# Patient Record
Sex: FEMALE | Race: WHITE | Employment: OTHER | URBAN - METROPOLITAN AREA
[De-identification: names, ages, dates, MRNs, and addresses within clinical notes are randomized per-mention and may not be internally consistent; named-entity substitution may affect disease eponyms.]

---

## 2017-03-03 ENCOUNTER — HOSPITAL ENCOUNTER (OUTPATIENT)
Dept: NUCLEAR MEDICINE | Facility: CLINIC | Age: 67
Setting detail: NUCLEAR MEDICINE
Discharge: HOME | End: 2017-03-03
Attending: INTERNAL MEDICINE | Admitting: INTERNAL MEDICINE
Payer: COMMERCIAL

## 2017-03-03 DIAGNOSIS — I10 BENIGN ESSENTIAL HYPERTENSION: ICD-10-CM

## 2017-03-03 DIAGNOSIS — I25.10 CORONARY ARTERY DISEASE INVOLVING NATIVE CORONARY ARTERY, ANGINA PRESENCE UNSPECIFIED, UNSPECIFIED WHETHER NATIVE OR TRANSPLANTED HEART: ICD-10-CM

## 2017-03-03 DIAGNOSIS — E78.5 HYPERLIPIDEMIA LDL GOAL <70: ICD-10-CM

## 2017-03-03 LAB
ALT SERPL W P-5'-P-CCNC: 30 U/L (ref 0–50)
ANION GAP SERPL CALCULATED.3IONS-SCNC: 6 MMOL/L (ref 3–14)
BUN SERPL-MCNC: 15 MG/DL (ref 7–30)
CALCIUM SERPL-MCNC: 9.2 MG/DL (ref 8.5–10.1)
CHLORIDE SERPL-SCNC: 104 MMOL/L (ref 94–109)
CHOLEST SERPL-MCNC: 142 MG/DL
CO2 SERPL-SCNC: 30 MMOL/L (ref 20–32)
CREAT SERPL-MCNC: 0.65 MG/DL (ref 0.52–1.04)
GFR SERPL CREATININE-BSD FRML MDRD: NORMAL ML/MIN/1.7M2
GLUCOSE SERPL-MCNC: 80 MG/DL (ref 70–99)
HDLC SERPL-MCNC: 62 MG/DL
LDLC SERPL CALC-MCNC: 64 MG/DL
NONHDLC SERPL-MCNC: 80 MG/DL
POTASSIUM SERPL-SCNC: 4.2 MMOL/L (ref 3.4–5.3)
SODIUM SERPL-SCNC: 140 MMOL/L (ref 133–144)
TRIGL SERPL-MCNC: 78 MG/DL

## 2017-03-03 PROCEDURE — 93017 CV STRESS TEST TRACING ONLY: CPT

## 2017-03-03 PROCEDURE — A9502 TC99M TETROFOSMIN: HCPCS | Performed by: INTERNAL MEDICINE

## 2017-03-03 PROCEDURE — 84460 ALANINE AMINO (ALT) (SGPT): CPT | Performed by: INTERNAL MEDICINE

## 2017-03-03 PROCEDURE — 34300033 ZZH RX 343: Performed by: INTERNAL MEDICINE

## 2017-03-03 PROCEDURE — 93016 CV STRESS TEST SUPVJ ONLY: CPT | Performed by: INTERNAL MEDICINE

## 2017-03-03 PROCEDURE — 78452 HT MUSCLE IMAGE SPECT MULT: CPT

## 2017-03-03 PROCEDURE — 80061 LIPID PANEL: CPT | Performed by: INTERNAL MEDICINE

## 2017-03-03 PROCEDURE — 93018 CV STRESS TEST I&R ONLY: CPT | Performed by: INTERNAL MEDICINE

## 2017-03-03 PROCEDURE — 80048 BASIC METABOLIC PNL TOTAL CA: CPT | Performed by: INTERNAL MEDICINE

## 2017-03-03 PROCEDURE — 36415 COLL VENOUS BLD VENIPUNCTURE: CPT | Performed by: INTERNAL MEDICINE

## 2017-03-03 PROCEDURE — 78452 HT MUSCLE IMAGE SPECT MULT: CPT | Mod: 26 | Performed by: INTERNAL MEDICINE

## 2017-03-03 PROCEDURE — 25000128 H RX IP 250 OP 636: Performed by: INTERNAL MEDICINE

## 2017-03-03 RX ORDER — REGADENOSON 0.08 MG/ML
0.4 INJECTION, SOLUTION INTRAVENOUS ONCE
Status: COMPLETED | OUTPATIENT
Start: 2017-03-03 | End: 2017-03-03

## 2017-03-03 RX ADMIN — TETROFOSMIN 9.9 MCI.: 0.23 INJECTION, POWDER, LYOPHILIZED, FOR SOLUTION INTRAVENOUS at 12:15

## 2017-03-03 RX ADMIN — TETROFOSMIN 32.6 MCI.: 0.23 INJECTION, POWDER, LYOPHILIZED, FOR SOLUTION INTRAVENOUS at 13:26

## 2017-03-03 RX ADMIN — REGADENOSON 0.4 MG: 0.08 INJECTION, SOLUTION INTRAVENOUS at 13:26

## 2017-03-06 ENCOUNTER — OFFICE VISIT (OUTPATIENT)
Dept: CARDIOLOGY | Facility: CLINIC | Age: 67
End: 2017-03-06
Attending: INTERNAL MEDICINE
Payer: COMMERCIAL

## 2017-03-06 VITALS
BODY MASS INDEX: 38.78 KG/M2 | DIASTOLIC BLOOD PRESSURE: 75 MMHG | SYSTOLIC BLOOD PRESSURE: 116 MMHG | WEIGHT: 192 LBS | OXYGEN SATURATION: 94 % | HEART RATE: 61 BPM

## 2017-03-06 DIAGNOSIS — I10 BENIGN ESSENTIAL HYPERTENSION: ICD-10-CM

## 2017-03-06 DIAGNOSIS — Z98.61 POSTSURGICAL PERCUTANEOUS TRANSLUMINAL CORONARY ANGIOPLASTY STATUS: Primary | ICD-10-CM

## 2017-03-06 DIAGNOSIS — I25.10 CORONARY ARTERY DISEASE INVOLVING NATIVE CORONARY ARTERY, ANGINA PRESENCE UNSPECIFIED, UNSPECIFIED WHETHER NATIVE OR TRANSPLANTED HEART: ICD-10-CM

## 2017-03-06 DIAGNOSIS — R94.39 ABNORMAL CARDIOVASCULAR STRESS TEST: ICD-10-CM

## 2017-03-06 DIAGNOSIS — E78.5 HYPERLIPIDEMIA LDL GOAL <70: ICD-10-CM

## 2017-03-06 PROCEDURE — 99214 OFFICE O/P EST MOD 30 MIN: CPT | Performed by: INTERNAL MEDICINE

## 2017-03-06 NOTE — PATIENT INSTRUCTIONS
Thank you for your [facility] visit today. Your provider has recommended the following:  Medication Changes:  1. STOP brilinta at the end of your prescription (June 17)  Recommendations:  1. Fasting blood work to be done in 4 months just prior to your follow up with Dr Tovar  Follow-up:  See Dr Tovar for cardiology follow up in 4 months.  We kindly ask that you call cardiology scheduling at [phone] three months prior to requested revisit date to schedule future cardiology appointments.  Reminder:  1. Please bring in your current medication list or your medication, over the counter supplements and vitamin bottles as we will review these at each office visit.               [facility] HEART CARE  [facility]~[address]~[place], [place]~[address]  Questions about your visit today?  Call your Cardiology Clinic RN's-Akilah Gabriel and/or Rocio Mckenzie at [phone].

## 2017-03-06 NOTE — MR AVS SNAPSHOT
After Visit Summary   3/6/2017    Sandra K Severson    MRN: [id number]           Patient Information     Date Of Birth          1950        Visit Information        Provider Department      3/6/2017 3:00 PM Randy Tovar MD [facility] PHYSICIAN HEART AT [facility]        Today's Diagnoses     Coronary artery disease involving native coronary artery, angina presence unspecified, unspecified whether native or transplanted heart        Benign essential hypertension        Hyperlipidemia LDL goal <70          Care Instructions    Thank you for your  Heart Care visit today. Your provider has recommended the following:  Medication Changes:  1. STOP brilinta at the end of your prescription (June 17)  Recommendations:  1. Fasting blood work to be done in 4 months just prior to your follow up with Dr Tovar  Follow-up:  See Dr Tovar for cardiology follow up in 4 months.  We kindly ask that you call cardiology scheduling at [phone] three months prior to requested revisit date to schedule future cardiology appointments.  Reminder:  1. Please bring in your current medication list or your medication, over the counter supplements and vitamin bottles as we will review these at each office visit.               [facility]~[address]~[place], [place]~[address]  Questions about your visit today?  Call your Cardiology Clinic RN's-Akilah Gabriel and/or Rocio Mckenzie at [phone].              Follow-ups after your visit        Additional Services     Follow-Up with Cardiologist                 Future tests that were ordered for you today     Open Future Orders        Priority Expected Expires Ordered    Lipid panel reflex to direct LDL Routine 7/6/2017 3/6/2019 3/6/2017    ALT Routine 7/6/2017 3/6/2019 3/6/2017    Basic metabolic panel Routine 7/6/2017 3/6/2019 3/6/2017    Follow-Up with Cardiologist Routine 7/6/2017  "3/6/2019 3/6/2017            Who to contact     If you have questions or need follow up information about today's clinic visit or your schedule please contact [facility] PHYSICIAN HEART AT [facility] directly at [phone].  Normal or non-critical lab and imaging results will be communicated to you by MyChart, letter or phone within 4 business days after the clinic has received the results. If you do not hear from us within 7 days, please contact the clinic through INRIXhart or phone. If you have a critical or abnormal lab result, we will notify you by phone as soon as possible.  Submit refill requests through MyCadbox or call your pharmacy and they will forward the refill request to us. Please allow 3 business days for your refill to be completed.          Additional Information About Your Visit        INRIXharSportID Information     MyCadbox lets you send messages to your doctor, view your test results, renew your prescriptions, schedule appointments and more. To sign up, go to [email or web address] . Click on \"Log in\" on the left side of the screen, which will take you to the Welcome page. Then click on \"Sign up Now\" on the right side of the page.     You will be asked to enter the access code listed below, as well as some personal information. Please follow the directions to create your username and password.     Your access code is: [email or web address]  Expires: 2017  3:32 PM     Your access code will  in 90 days. If you need help or a new code, please call your [place] clinic or [phone].        Care EveryWhere ID     This is your Care EveryWhere ID. This could be used by other organizations to access your [place] medical records  [id number]        Your Vitals Were     Pulse Pulse Oximetry BMI (Body Mass Index)             61 94% 38.78 kg/m2          Blood Pressure from Last 3 Encounters:   17 116/75   09/10/16 141/83   16 123/80    Weight from Last 3 Encounters:   17 87.1 " kg (192 lb)   09/08/16 84.8 kg (187 lb)   06/22/16 83.9 kg (185 lb)              We Performed the Following     Follow-Up with Cardiologist          Today's Medication Changes          These changes are accurate as of: 3/6/17  3:32 PM.  If you have any questions, ask your nurse or doctor.               Stop taking these medicines if you haven't already. Please contact your care team if you have questions.     ciprofloxacin 500 MG tablet   Commonly known as:  CIPRO   Stopped by:  Randy Tovar MD                    Primary Care Provider Office Phone # Fax #    Freya Maria Teresa Lopez [phone]       [facility] [address] [facility] [address]        Thank you!     Thank you for choosing [facility] PHYSICIAN HEART AT [facility]  for your care. Our goal is always to provide you with excellent care. Hearing back from our patients is one way we can continue to improve our services. Please take a few minutes to complete the written survey that you may receive in the mail after your visit with us. Thank you!             Your Updated Medication List - Protect others around you: Learn how to safely use, store and throw away your medicines at www.disposemymeds.org.          This list is accurate as of: 3/6/17  3:32 PM.  Always use your most recent med list.                   Brand Name Dispense Instructions for use    aspirin 81 MG EC tablet     90 tablet    Take 1 tablet (81 mg) by mouth daily Start tomorrow morning.       atorvastatin 80 MG tablet    LIPITOR    90 tablet    Take 1 tablet (80 mg) by mouth daily       calcium-vitamin D 500-125 MG-UNIT Tabs          ezetimibe 10 MG tablet    ZETIA    90 tablet    Take 1 tablet (10 mg) by mouth daily       lisinopril 30 MG tablet    PRINIVIL,ZESTRIL    90 tablet    Take 1 tablet (30 mg) by mouth daily       metoprolol 50 MG 24 hr tablet    TOPROL-XL    90 tablet    Take 1 tablet (50 mg) by mouth every morning        "nitroglycerin 0.4 MG sublingual tablet    NITROSTAT    25 tablet    One tablet under the tongue every 5 minutes if needed for chest pain. May repeat every 5 minutes for a maximum of 3 doses in 15 minutes\"       ticagrelor 90 MG tablet    BRILINTA    180 tablet    Take 1 tablet (90 mg) by mouth 2 times daily Start this evening.         "

## 2017-03-06 NOTE — LETTER
3/6/2017    Freya Lopez MD  [facility]   [address]  [facility] [address]    RE: Christine K Severson       Dear Colleague,    I had the pleasure of seeing Christinera K Severson in the [facility].    The patient is a 66-year-old moderately overweight white female with a history of ischemic disease dating back to 2001 when she had an inferior wall myocardial infarction.  She was treated with tPA but ended up undergoing coronary angiography with thrombectomy and 2 stents placed in the right coronary artery.  She had no other significant disease at that time.  She had a history of hyperlipidemia.  She has had increased symptoms of fatigue and chest tightness late in 2015.  Stress test was abnormal, after which she had metoprolol therapy added.  The symptoms did not improve on metoprolol therapy, and it was arranged for her to undergo cardiac catheterization and coronary angiography in 03/2016.  She had a mid-circumflex of 80% and was treated with a 3.5 x 20 mm drug-eluting stent with minimal residual.  There was minimal calcification in the left anterior descending with 40% proximal narrowing of the right coronary artery and 40% distal right coronary artery narrowing and 60% mid coronary artery narrowing with a normal FFR.  The stent in the right coronary artery was fully patent.  There was no left ventriculogram performed.  She has had normal ejection fraction from stress testing in 2015 at 60%.  She had some tendency towards confusion, difficulty with memory during the time of the test, which was thought to be related to sedation anesthesia.  She has been able to participate in activities without significant restriction.  She had a Lexiscan Cardiolite stress test performed which demonstrated slight decrease in inferior apical activity, most likely related to soft-tissue attenuation and bowel uptake artifact but no evidence of significant ischemia or infarct.   Ejection fraction was 64% with no significant regional wall motion abnormality.  This was improved from the study of 12/2015.  The patient also had an echocardiogram in 2016 in September that showed an ejection fraction of 50%-55% with slight basilar inferolateral hypokinesis but no other significant valvular disease or regional wall motion abnormality present.      MEDICATIONS:   1.  Metoprolol XL 50 mg a day.   2.  Lisinopril 30 mg a day.   3.  Atorvastatin 80 mg a day.   4.  Ezetimibe 10 mg a day.   5.  Brilinta 90 mg twice a day.   6.  Aspirin 81 mg a day.   7.  Calcium, vitamin D 500/125 one tablet a day.   8.  Nitroglycerin 0.4 mg sublingual p.r.n., which has not been used.      LABORATORY DATA:  Demonstrated cholesterol 142, HDL 62, LDL 64, triglycerides 78, sodium 140, potassium 4.2, BUN 15, creatinine 0.65.  ALT 30.      The patient is able to participate in activities without significant symptoms of chest discomfort, shortness of breath, dizziness, palpitations, nausea, vomiting, diaphoresis or syncope.  She denies PND, orthopnea, fever, chills or sweats.  She presents to Cardiology Clinic for followup of ischemic heart disease.      PHYSICAL EXAMINATION:   VITAL SIGNS:  Blood pressure 116/75 with a heart rate of 61 and regular.  Weight was 192 pounds which is up 8 pounds from recent clinic visit.   NECK:  Without jugular venous distention, carotid bruit or palpable thyroid.   CHEST:  Essentially clear to percussion and auscultation with slight decreased breath sounds at the bases.   CARDIAC:  Revealed a regular rhythm.  There is a soft S4 gallop.  There is a 1-2/6 systolic murmur at the left sternal border with minimal radiation.  No diastolic murmur, rub or S3.   EXTREMITIES:  Without cyanosis or edema.      CLINICAL IMPRESSION:   1.  Stable cardiac condition.   2.  History of ischemic heart disease, status post inferior wall myocardial infarction in 2001 with PTCA with thrombectomy and PTCA/stent placed  in the right coronary artery.   3.  Status post PTCA and stent placed in the mid-circumflex coronary artery in 2016.   4.  Hyperlipidemia, at goal.   5.  Hypertension, at goal.   6.  Overweight with increased weight gain.      DISCUSSION:  The patient has done well clinically from the last clinic visit.  She has had no significant symptoms of angina pectoris or congestive heart failure.  She has had no further confusion or mental issues that occurred in 2016.  Her Cardiolite stress test shows no evidence of significant ischemia or infarct.  She now has stents placed in the right coronary artery and circumflex coronary artery.  Her Brilinta will be continued through the summer or until this summer, which will be approximately 15 months.  She has had 2 interventions and does have diffuse coronary disease at this time.  She does have easy bruisability and is concerned about the long-term on the Brilinta versus just aspirin.  She will need close followup of her serum lipids, basic metabolic panel and blood pressure.  Otherwise, she appears to be doing clinically well.  She needs a strict diet to lose weight.      RECOMMENDATIONS:   1.  Continue present medications.  Discontinue Brilinta in 90 days.   2.  Close followup of serum lipids, basic metabolic panel and blood pressure.   3.  Diet and exercise program as tolerated to lose weight.   4.  Routine medical followup.   5.  Cardiology followup up in 4 months.            Again, thank you for allowing me to participate in the care of your patient.      Sincerely,    Randy Tovar MD     [facility]

## 2017-03-07 NOTE — PROGRESS NOTES
HISTORY OF PRESENT ILLNESS:  The patient is a 66-year-old moderately overweight white female with a history of ischemic disease dating back to 2001 when she had an inferior wall myocardial infarction.  She was treated with tPA but ended up undergoing coronary angiography with thrombectomy and 2 stents placed in the right coronary artery.  She had no other significant disease at that time.  She had a history of hyperlipidemia.  She has had increased symptoms of fatigue and chest tightness late in 2015.  Stress test was abnormal, after which she had metoprolol therapy added.  The symptoms did not improve on metoprolol therapy, and it was arranged for her to undergo cardiac catheterization and coronary angiography in 03/2016.  She had a mid-circumflex of 80% and was treated with a 3.5 x 20 mm drug-eluting stent with minimal residual.  There was minimal calcification in the left anterior descending with 40% proximal narrowing of the right coronary artery and 40% distal right coronary artery narrowing and 60% mid coronary artery narrowing with a normal FFR.  The stent in the right coronary artery was fully patent.  There was no left ventriculogram performed.  She has had normal ejection fraction from stress testing in 2015 at 60%.  She had some tendency towards confusion, difficulty with memory during the time of the test, which was thought to be related to sedation anesthesia.  She has been able to participate in activities without significant restriction.  She had a Lexiscan Cardiolite stress test performed which demonstrated slight decrease in inferior apical activity, most likely related to soft-tissue attenuation and bowel uptake artifact but no evidence of significant ischemia or infarct.  Ejection fraction was 64% with no significant regional wall motion abnormality.  This was improved from the study of 12/2015.  The patient also had an echocardiogram in 2016 in September that showed an ejection fraction of  50%-55% with slight basilar inferolateral hypokinesis but no other significant valvular disease or regional wall motion abnormality present.      MEDICATIONS:   1.  Metoprolol XL 50 mg a day.   2.  Lisinopril 30 mg a day.   3.  Atorvastatin 80 mg a day.   4.  Ezetimibe 10 mg a day.   5.  Brilinta 90 mg twice a day.   6.  Aspirin 81 mg a day.   7.  Calcium, vitamin D 500/125 one tablet a day.   8.  Nitroglycerin 0.4 mg sublingual p.r.n., which has not been used.      LABORATORY DATA:  Demonstrated cholesterol 142, HDL 62, LDL 64, triglycerides 78, sodium 140, potassium 4.2, BUN 15, creatinine 0.65.  ALT 30.      The patient is able to participate in activities without significant symptoms of chest discomfort, shortness of breath, dizziness, palpitations, nausea, vomiting, diaphoresis or syncope.  She denies PND, orthopnea, fever, chills or sweats.  She presents to Cardiology Clinic for followup of ischemic heart disease.      PHYSICAL EXAMINATION:   VITAL SIGNS:  Blood pressure 116/75 with a heart rate of 61 and regular.  Weight was 192 pounds which is up 8 pounds from recent clinic visit.   NECK:  Without jugular venous distention, carotid bruit or palpable thyroid.   CHEST:  Essentially clear to percussion and auscultation with slight decreased breath sounds at the bases.   CARDIAC:  Revealed a regular rhythm.  There is a soft S4 gallop.  There is a 1-2/6 systolic murmur at the left sternal border with minimal radiation.  No diastolic murmur, rub or S3.   EXTREMITIES:  Without cyanosis or edema.      CLINICAL IMPRESSION:   1.  Stable cardiac condition.   2.  History of ischemic heart disease, status post inferior wall myocardial infarction in 2001 with PTCA with thrombectomy and PTCA/stent placed in the right coronary artery.   3.  Status post PTCA and stent placed in the mid-circumflex coronary artery in 2016.   4.  Hyperlipidemia, at goal.   5.  Hypertension, at goal.   6.  Overweight with increased weight gain.       DISCUSSION:  The patient has done well clinically from the last clinic visit.  She has had no significant symptoms of angina pectoris or congestive heart failure.  She has had no further confusion or mental issues that occurred in 2016.  Her Cardiolite stress test shows no evidence of significant ischemia or infarct.  She now has stents placed in the right coronary artery and circumflex coronary artery.  Her Brilinta will be continued through the summer or until this summer, which will be approximately 15 months.  She has had 2 interventions and does have diffuse coronary disease at this time.  She does have easy bruisability and is concerned about the long-term on the Brilinta versus just aspirin.  She will need close followup of her serum lipids, basic metabolic panel and blood pressure.  Otherwise, she appears to be doing clinically well.  She needs a strict diet to lose weight.      RECOMMENDATIONS:   1.  Continue present medications.  Discontinue Brilinta in 90 days.   2.  Close followup of serum lipids, basic metabolic panel and blood pressure.   3.  Diet and exercise program as tolerated to lose weight.   4.  Routine medical followup.   5.  Cardiology followup up in 4 months.      cc:   [place] [address]         RONNY ZEPEDA MD, [facility]             D: 2017 15:49   T: 2017 05:34   MT: TAD      Name:     SEVERSON, SANDRA   MRN:      [id number]        Account:      [id number]   :      1950           Service Date: 2017      Document: [id number]

## 2017-09-17 ENCOUNTER — HEALTH MAINTENANCE LETTER (OUTPATIENT)
Age: 67
End: 2017-09-17

## 2017-09-29 ENCOUNTER — ALLIED HEALTH/NURSE VISIT (OUTPATIENT)
Dept: FAMILY MEDICINE | Facility: CLINIC | Age: 67
End: 2017-09-29
Payer: COMMERCIAL

## 2017-09-29 ENCOUNTER — OFFICE VISIT (OUTPATIENT)
Dept: CARDIOLOGY | Facility: CLINIC | Age: 67
End: 2017-09-29
Attending: INTERNAL MEDICINE
Payer: COMMERCIAL

## 2017-09-29 VITALS
SYSTOLIC BLOOD PRESSURE: 148 MMHG | HEART RATE: 70 BPM | BODY MASS INDEX: 39.91 KG/M2 | DIASTOLIC BLOOD PRESSURE: 84 MMHG | OXYGEN SATURATION: 95 % | WEIGHT: 197.6 LBS

## 2017-09-29 DIAGNOSIS — E78.5 HYPERLIPIDEMIA LDL GOAL <70: ICD-10-CM

## 2017-09-29 DIAGNOSIS — I10 BENIGN ESSENTIAL HYPERTENSION: ICD-10-CM

## 2017-09-29 DIAGNOSIS — Z23 NEED FOR PROPHYLACTIC VACCINATION AND INOCULATION AGAINST INFLUENZA: Primary | ICD-10-CM

## 2017-09-29 DIAGNOSIS — I25.10 CORONARY ARTERY DISEASE INVOLVING NATIVE CORONARY ARTERY, ANGINA PRESENCE UNSPECIFIED, UNSPECIFIED WHETHER NATIVE OR TRANSPLANTED HEART: ICD-10-CM

## 2017-09-29 DIAGNOSIS — I25.10 CORONARY ARTERY DISEASE INVOLVING NATIVE CORONARY ARTERY OF NATIVE HEART WITHOUT ANGINA PECTORIS: Primary | ICD-10-CM

## 2017-09-29 LAB
ALT SERPL W P-5'-P-CCNC: 31 U/L (ref 0–50)
ANION GAP SERPL CALCULATED.3IONS-SCNC: 5 MMOL/L (ref 3–14)
BUN SERPL-MCNC: 15 MG/DL (ref 7–30)
CALCIUM SERPL-MCNC: 9.7 MG/DL (ref 8.5–10.1)
CHLORIDE SERPL-SCNC: 108 MMOL/L (ref 94–109)
CHOLEST SERPL-MCNC: 137 MG/DL
CO2 SERPL-SCNC: 31 MMOL/L (ref 20–32)
CREAT SERPL-MCNC: 0.64 MG/DL (ref 0.52–1.04)
GFR SERPL CREATININE-BSD FRML MDRD: >90 ML/MIN/1.7M2
GLUCOSE SERPL-MCNC: 92 MG/DL (ref 70–99)
HDLC SERPL-MCNC: 65 MG/DL
LDLC SERPL CALC-MCNC: 59 MG/DL
NONHDLC SERPL-MCNC: 72 MG/DL
POTASSIUM SERPL-SCNC: 5 MMOL/L (ref 3.4–5.3)
SODIUM SERPL-SCNC: 144 MMOL/L (ref 133–144)
TRIGL SERPL-MCNC: 64 MG/DL

## 2017-09-29 PROCEDURE — 90662 IIV NO PRSV INCREASED AG IM: CPT

## 2017-09-29 PROCEDURE — 84460 ALANINE AMINO (ALT) (SGPT): CPT | Performed by: INTERNAL MEDICINE

## 2017-09-29 PROCEDURE — G0008 ADMIN INFLUENZA VIRUS VAC: HCPCS

## 2017-09-29 PROCEDURE — 80061 LIPID PANEL: CPT | Performed by: INTERNAL MEDICINE

## 2017-09-29 PROCEDURE — 99214 OFFICE O/P EST MOD 30 MIN: CPT | Performed by: NURSE PRACTITIONER

## 2017-09-29 PROCEDURE — 36415 COLL VENOUS BLD VENIPUNCTURE: CPT | Performed by: INTERNAL MEDICINE

## 2017-09-29 PROCEDURE — 99207 ZZC NO CHARGE NURSE ONLY: CPT

## 2017-09-29 PROCEDURE — 80048 BASIC METABOLIC PNL TOTAL CA: CPT | Performed by: INTERNAL MEDICINE

## 2017-09-29 RX ORDER — METOPROLOL SUCCINATE 50 MG/1
50 TABLET, EXTENDED RELEASE ORAL EVERY MORNING
Qty: 90 TABLET | Refills: 3 | Status: SHIPPED | OUTPATIENT
Start: 2017-09-29 | End: 2018-01-08

## 2017-09-29 RX ORDER — ATORVASTATIN CALCIUM 80 MG/1
80 TABLET, FILM COATED ORAL DAILY
Qty: 90 TABLET | Refills: 3 | Status: SHIPPED | OUTPATIENT
Start: 2017-09-29 | End: 2018-01-08

## 2017-09-29 RX ORDER — LISINOPRIL 30 MG/1
30 TABLET ORAL DAILY
Qty: 90 TABLET | Refills: 3 | Status: SHIPPED | OUTPATIENT
Start: 2017-09-29 | End: 2018-01-08

## 2017-09-29 RX ORDER — EZETIMIBE 10 MG/1
10 TABLET ORAL DAILY
Qty: 90 TABLET | Refills: 3 | Status: CANCELLED | OUTPATIENT
Start: 2017-09-29

## 2017-09-29 NOTE — PROGRESS NOTES
"HPI and Plan:   See dictation    Orders Placed This Encounter   Procedures     Lipid Profile     ALT     Basic metabolic panel     Follow-Up with Cardiologist       Orders Placed This Encounter   Medications     metoprolol (TOPROL-XL) 50 MG 24 hr tablet     Sig: Take 1 tablet (50 mg) by mouth every morning     Dispense:  90 tablet     Refill:  3     lisinopril (PRINIVIL,ZESTRIL) 30 MG tablet     Sig: Take 1 tablet (30 mg) by mouth daily     Dispense:  90 tablet     Refill:  3     atorvastatin (LIPITOR) 80 MG tablet     Sig: Take 1 tablet (80 mg) by mouth daily     Dispense:  90 tablet     Refill:  3       Medications Discontinued During This Encounter   Medication Reason     ticagrelor (BRILINTA) 90 MG tablet Therapy completed     metoprolol (TOPROL-XL) 50 MG 24 hr tablet Reorder     lisinopril (PRINIVIL,ZESTRIL) 30 MG tablet Reorder     atorvastatin (LIPITOR) 80 MG tablet Reorder         Encounter Diagnoses   Name Primary?     Benign essential hypertension      Hyperlipidemia LDL goal <70      Coronary artery disease involving native coronary artery of native heart without angina pectoris Yes       CURRENT MEDICATIONS:  Current Outpatient Prescriptions   Medication Sig Dispense Refill     metoprolol (TOPROL-XL) 50 MG 24 hr tablet Take 1 tablet (50 mg) by mouth every morning 90 tablet 3     lisinopril (PRINIVIL,ZESTRIL) 30 MG tablet Take 1 tablet (30 mg) by mouth daily 90 tablet 3     atorvastatin (LIPITOR) 80 MG tablet Take 1 tablet (80 mg) by mouth daily 90 tablet 3     ezetimibe (ZETIA) 10 MG tablet Take 1 tablet (10 mg) by mouth daily 90 tablet 3     nitroglycerin (NITROSTAT) 0.4 MG SL tablet One tablet under the tongue every 5 minutes if needed for chest pain. May repeat every 5 minutes for a maximum of 3 doses in 15 minutes\" 25 tablet 3     aspirin 81 MG EC tablet Take 1 tablet (81 mg) by mouth daily Start tomorrow morning. 90 tablet 3     calcium-vitamin D 500-125 MG-UNIT TABS        [DISCONTINUED] metoprolol " (TOPROL-XL) 50 MG 24 hr tablet Take 1 tablet (50 mg) by mouth every morning 90 tablet 3     [DISCONTINUED] lisinopril (PRINIVIL,ZESTRIL) 30 MG tablet Take 1 tablet (30 mg) by mouth daily 90 tablet 3     [DISCONTINUED] atorvastatin (LIPITOR) 80 MG tablet Take 1 tablet (80 mg) by mouth daily 90 tablet 3       ALLERGIES     Allergies   Allergen Reactions     Bupropion Hcl      rash     Cyclobenzaprine Hives       PAST MEDICAL HISTORY:  Past Medical History:   Diagnosis Date     Abnormal stress ECG      Fibromyalgia      Hyperlipidemia      Hypertension      MI (myocardial infarction) (H) age 51    MI in 2001     Myocardial infarction (H)        PAST SURGICAL HISTORY:  Past Surgical History:   Procedure Laterality Date     COLONOSCOPY  2011    Procedure:COLONOSCOPY; Screening Colonoscopy; Surgeon:ALISHA JOY; Location:[facility]     D & C  [facility]    D&C     SURGICAL HISTORY OF -       Status post MI,      SURGICAL HISTORY OF -       Stents x2       FAMILY HISTORY:  Family History   Problem Relation Age of Onset     HEART DISEASE Mother      congenital,  at 82     DIABETES Mother      GASTROINTESTINAL DISEASE Mother      colon polyps non cncerous     HEART DISEASE Father      first MI in mid-50s     CEREBROVASCULAR DISEASE Father       at 61     Neurologic Disorder Maternal Grandfather      brain hemmorage     HEART DISEASE Brother       of MI in early 50s     Hypertension Daughter      CANCER Sister      endometrial cancer     GASTROINTESTINAL DISEASE Sister      colon polyps non cancerous       SOCIAL HISTORY:  Social History     Social History     Marital status:      Spouse name: N/A     Number of children: N/A     Years of education: N/A     Social History Main Topics     Smoking status: Former Smoker     Quit date: 10/13/2001     Smokeless tobacco: Never Used     Alcohol use Yes      Comment: rarely     Drug use: No     Sexual activity: Yes     Partners: Male     Other Topics  Concern     Parent/Sibling W/ Cabg, Mi Or Angioplasty Before 65f 55m? Yes     father and brother     Social History Narrative        Daughter with severe hypertension    Healthy son    Works as chemical dependency technician       Review of Systems:  Skin:  Negative       Eyes:  Positive for glasses    ENT:  Negative      Respiratory:  Negative       Cardiovascular:  Negative      Gastroenterology: Positive for heartburn    Genitourinary:  Negative      Musculoskeletal:  Positive for fibromyalgia;joint pain;joint swelling;joint stiffness    Neurologic:  Positive for numbness or tingling of hands    Psychiatric:  Negative      Heme/Lymph/Imm:  Negative      Endocrine:  Negative        Physical Exam:  Vitals: /84  Pulse 70  Wt 89.6 kg (197 lb 9.6 oz)  SpO2 95%  BMI 39.91 kg/m2    Constitutional:  cooperative, alert and oriented, well developed, well nourished, in no acute distress overweight      Skin:  warm and dry to the touch        Head:  normocephalic        Eyes:  sclera white        ENT:  no pallor or cyanosis        Neck:  carotid pulses are full and equal bilaterally        Chest:    prolonged expiration        Cardiac: regular rhythm;normal S1 and S2   S4   systolic murmur;LUSB;grade 1          Abdomen:  abdomen soft        Vascular: pulses full and equal                                        Extremities and Back:  no edema              Neurological:  no gross motor deficits;affect appropriate              CC  Randy Tovar MD  [address]  [place] [address]

## 2017-09-29 NOTE — PROGRESS NOTES
Cardiology Clinic Progress Note  Sandra K Severson MRN# [id number]   YOB: 1950 Age: 67 year old     Reason For Visit:  six-month follow-up    Primary Cardiologist:   Dr. Tovar          History of Presenting Illness:    Sandra K Severson is a pleasant 67 year old patient with a past cardiac history significant for CAD status post inferior MI 2001 with thrombectomy and stent to the  RCA, and stent to the mid circumflex in 2016, hypertension, and hyperlipidemia.    Pt was last seen by Dr. Tovar 3/6/2017.  patient had been doing well and was instructed to discontinue Brilinta at the end of June.  Most recent echocardiogram 2016 with LVEF 50-55%, slight basilar inferolateral hypokinesis, no regional wall motion abnormality, and no significant valvular disease.    Pt presents today for six-month follow-up.  Lipid profile and BMP today are within normal limits with LDL at goal.  These results were reviewed with her today.  Since she was last seen, she has been doing well from a cardiac standpoint without any complaints of angina or heart failure.  Her weight today in the clinic is up 5 pounds but she has been trying to work on weight loss.  She has recently retired and has been going on the treadmill 5-6 days per week.  She has noticed some toning but no weight loss yet.  She recently had new insurance after retiring and has noted that her Zetia is more expensive.  This was $100 for a three month prescription.  She just got this filled for three months so will continue but is not sure that she will refill it again due to cost.  I let her know that if she does not refill this we will check her lipids when she returns for Dr. Tovar and he can change her statin therapy, if needed.  She has discontinued her Brilinta and continues on aspirin only.  Her blood pressure today in the clinic is 148/84.  She was previously checking these at home and getting readings of 120 systolically.  She has not checked them for  a while since they were so good.  She does note that she had a large cup of coffee prior to her visit this morning which may be contributing to her elevated blood pressures.  Overall, she has not increased her caffeine or alcohol intake.  She will check her home blood pressures and call us if these are consistently greater than 140 systolic.  Patient reports no chest pain, shortness of breath, PND, orthopnea, presyncope, syncope, edema, heart racing, or palpitations.      Current Cardiac Medications    metoprolol XL 50 mg daily  Lisinopril 30 mg daily  Atorvastatin 80 mg daily  Zetia 10 mg daily  Nitroglycerin p.r.n.  Aspirin 81 mg daily                   Assessment and Plan:     Plan  1.  Call the clinic if your blood pressure is consistently greater than 140/90.   2.  Continue current medications, as Zetia is too expensive may continue with atorvastatin only for the month prior to seeing Dr. Tovar and we will recheck lipid profile prior to seeing him  3.  Follow-up with Dr. Tovar in four months with lab work prior      1. CAD    status post inferior MI 2001 with stent to the RCA and stent to the midcircumflex in 2016    no angina    Continue statin, aspirin, ACE inhibitor, beta blocker      2. Hypertension    148/84, not well controlled    continue metoprolol, lisinopril      3. Hyperlipidemia    LDL 59 today    continue atorvastatin 80 mg daily and Zetia 10 mg daily         Thank you for allowing me to participate in this delightful patient's care.      This note was completed in part using Dragon voice recognition software. Although reviewed after completion, some word and grammatical errors may occur.    Mary Jenkins, TIMOTHY, CNP           Data:   All laboratory data reviewed

## 2017-09-29 NOTE — MR AVS SNAPSHOT
"              After Visit Summary   9/29/2017    Sandra K Severson    MRN: [id number]           Patient Information     Date Of Birth          1950        Visit Information        Provider Department      9/29/2017 10:40 AM [facility]        Today's Diagnoses     Need for prophylactic vaccination and inoculation against influenza    -  1       Follow-ups after your visit        Your next 10 appointments already scheduled     Sep 29, 2017 10:40 AM CDT   Nurse Only with [facility] ([facility])    [address] [place] [id number] [phone]            Sep 29, 2017 11:30 AM CDT   Return Visit with TIMOTHY Ramos CNP   [facility] ([facility])    [address] [id number] [phone]              Who to contact     If you have questions or need follow up information about today's clinic visit or your schedule please contact [facility] directly at [phone].  Normal or non-critical lab and imaging results will be communicated to you by Exeroshart, letter or phone within 4 business days after the clinic has received the results. If you do not hear from us within 7 days, please contact the clinic through [employer] or phone. If you have a critical or abnormal lab result, we will notify you by phone as soon as possible.  Submit refill requests through B-152 or call your pharmacy and they will forward the refill request to us. Please allow 3 business days for your refill to be completed.          Additional Information About Your Visit        MyChart Information     B-152 lets you send messages to your doctor, view your test results, renew your prescriptions, schedule appointments and more. To sign up, go to [email or web address] . Click on \"Log in\" on the left side of the screen, which will take you to the " "Welcome page. Then click on \"Sign up Now\" on the right side of the page.     You will be asked to enter the access code listed below, as well as some personal information. Please follow the directions to create your username and password.     Your access code is: [email or web address]  Expires: 2017 10:26 AM     Your access code will  in 90 days. If you need help or a new code, please call your [place] clinic or [phone].        Care EveryWhere ID     This is your Care EveryWhere ID. This could be used by other organizations to access your [place] medical records  [id number]         Blood Pressure from Last 3 Encounters:   17 116/75   09/10/16 141/83   16 123/80    Weight from Last 3 Encounters:   17 192 lb (87.1 kg)   16 187 lb (84.8 kg)   16 185 lb (83.9 kg)              We Performed the Following     ADMIN INFLUENZA (For MEDICARE Patients ONLY) []     FLU VACCINE, INCREASED ANTIGEN, PRESV FREE, AGE 65+ [18710]        Primary Care Provider Office Phone # Fax #    Freya Maria Teresa Lopez [phone] [address] [address]        Equal Access to Services     ANGIE JACKSON : Hadii aad ku hadasho Soomaali, waaxda luqadaha, qaybta kaalmada adeegyada, dominique vitale haykelsey barron . So [facility] [phone].    ATENCIÓN: Si habla español, tiene a romero disposición servicios gratuitos de asistencia lingüística. Llame al [phone].    We comply with applicable federal civil rights laws and [place] laws. We do not discriminate on the basis of race, color, national origin, age, disability sex, sexual orientation or gender identity.            Thank you!     Thank you for choosing [facility]  for your care. Our goal is always to provide you with excellent care. Hearing back from our patients is one way we can continue to improve our services. Please take a few minutes to complete the written survey that you may receive in " "the mail after your visit with us. Thank you!             Your Updated Medication List - Protect others around you: Learn how to safely use, store and throw away your medicines at www.disposemymeds.org.          This list is accurate as of: 9/29/17 10:26 AM.  Always use your most recent med list.                   Brand Name Dispense Instructions for use Diagnosis    aspirin 81 MG EC tablet     90 tablet    Take 1 tablet (81 mg) by mouth daily Start tomorrow morning.    Postsurgical percutaneous transluminal coronary angioplasty status, Coronary artery disease involving native coronary artery of native heart with angina pectoris (H), Status post coronary angioplasty       atorvastatin 80 MG tablet    LIPITOR    90 tablet    Take 1 tablet (80 mg) by mouth daily    Hyperlipidemia LDL goal <70       calcium-vitamin D 500-125 MG-UNIT Tabs           ezetimibe 10 MG tablet    ZETIA    90 tablet    Take 1 tablet (10 mg) by mouth daily    Hyperlipidemia LDL goal <70       lisinopril 30 MG tablet    PRINIVIL,ZESTRIL    90 tablet    Take 1 tablet (30 mg) by mouth daily    Benign essential hypertension       metoprolol 50 MG 24 hr tablet    TOPROL-XL    90 tablet    Take 1 tablet (50 mg) by mouth every morning    Coronary artery disease involving native coronary artery, angina presence unspecified, unspecified whether native or transplanted heart       nitroGLYcerin 0.4 MG sublingual tablet    NITROSTAT    25 tablet    One tablet under the tongue every 5 minutes if needed for chest pain. May repeat every 5 minutes for a maximum of 3 doses in 15 minutes\"    Postsurgical percutaneous transluminal coronary angioplasty status, Coronary artery disease involving native coronary artery of native heart with angina pectoris (H), Status post coronary angioplasty       ticagrelor 90 MG tablet    BRILINTA    180 tablet    Take 1 tablet (90 mg) by mouth 2 times daily Start this evening.    Postsurgical percutaneous transluminal coronary " angioplasty status, Coronary artery disease involving native coronary artery of native heart with angina pectoris (H), Status post coronary angioplasty

## 2017-09-29 NOTE — LETTER
9/29/2017    Freya Lopez MD  [id number]  [facility] [address]    RE: Sandra K Severson       Dear Colleague,    I had the pleasure of seeing Sandra K Severson in the [facility].    Cardiology Clinic Progress Note  Sandra K Severson MRN# [id number]   YOB: 1950 Age: 67 year old     Reason For Visit:  six-month follow-up    Primary Cardiologist:   Dr. Tovar          History of Presenting Illness:    Sandra K Severson is a pleasant 67 year old patient with a past cardiac history significant for CAD status post inferior MI 2001 with thrombectomy and stent to the  RCA, and stent to the mid circumflex in 2016, hypertension, and hyperlipidemia.    Pt was last seen by Dr. Tovar 3/6/2017.  patient had been doing well and was instructed to discontinue Brilinta at the end of June.  Most recent echocardiogram 2016 with LVEF 50-55%, slight basilar inferolateral hypokinesis, no regional wall motion abnormality, and no significant valvular disease.    Pt presents today for six-month follow-up.  Lipid profile and BMP today are within normal limits with LDL at goal.  These results were reviewed with her today.  Since she was last seen, she has been doing well from a cardiac standpoint without any complaints of angina or heart failure.  Her weight today in the clinic is up 5 pounds but she has been trying to work on weight loss.  She has recently retired and has been going on the treadmill 5-6 days per week.  She has noticed some toning but no weight loss yet.  She recently had new insurance after retiring and has noted that her Zetia is more expensive.  This was $100 for a three month prescription.  She just got this filled for three months so will continue but is not sure that she will refill it again due to cost.  I let her know that if she does not refill this we will check her lipids when she returns for Dr. Tovar and he can change her statin therapy, if needed.  She  has discontinued her Brilinta and continues on aspirin only.  Her blood pressure today in the clinic is 148/84.  She was previously checking these at home and getting readings of 120 systolically.  She has not checked them for a while since they were so good.  She does note that she had a large cup of coffee prior to her visit this morning which may be contributing to her elevated blood pressures.  Overall, she has not increased her caffeine or alcohol intake.  She will check her home blood pressures and call us if these are consistently greater than 140 systolic.  Patient reports no chest pain, shortness of breath, PND, orthopnea, presyncope, syncope, edema, heart racing, or palpitations.      Current Cardiac Medications    metoprolol XL 50 mg daily  Lisinopril 30 mg daily  Atorvastatin 80 mg daily  Zetia 10 mg daily  Nitroglycerin p.r.n.  Aspirin 81 mg daily                   Assessment and Plan:     Plan  1.  Call the clinic if your blood pressure is consistently greater than 140/90.   2.  Continue current medications, as Zetia is too expensive may continue with atorvastatin only for the month prior to seeing Dr. Tovar and we will recheck lipid profile prior to seeing him  3.  Follow-up with Dr. Tovar in four months with lab work prior      1. CAD    status post inferior MI 2001 with stent to the RCA and stent to the midcircumflex in 2016    no angina    Continue statin, aspirin, ACE inhibitor, beta blocker      2. Hypertension    148/84, not well controlled    continue metoprolol, lisinopril      3. Hyperlipidemia    LDL 59 today    continue atorvastatin 80 mg daily and Zetia 10 mg daily         Thank you for allowing me to participate in this delightful patient's care.      This note was completed in part using Dragon voice recognition software. Although reviewed after completion, some word and grammatical errors may occur.    Mary Jenkins, APRN, CNP           Data:   All laboratory data  "reviewed      HPI and Plan:   See dictation    Orders Placed This Encounter   Procedures     Lipid Profile     ALT     Basic metabolic panel     Follow-Up with Cardiologist       Orders Placed This Encounter   Medications     metoprolol (TOPROL-XL) 50 MG 24 hr tablet     Sig: Take 1 tablet (50 mg) by mouth every morning     Dispense:  90 tablet     Refill:  3     lisinopril (PRINIVIL,ZESTRIL) 30 MG tablet     Sig: Take 1 tablet (30 mg) by mouth daily     Dispense:  90 tablet     Refill:  3     atorvastatin (LIPITOR) 80 MG tablet     Sig: Take 1 tablet (80 mg) by mouth daily     Dispense:  90 tablet     Refill:  3       Medications Discontinued During This Encounter   Medication Reason     ticagrelor (BRILINTA) 90 MG tablet Therapy completed     metoprolol (TOPROL-XL) 50 MG 24 hr tablet Reorder     lisinopril (PRINIVIL,ZESTRIL) 30 MG tablet Reorder     atorvastatin (LIPITOR) 80 MG tablet Reorder         Encounter Diagnoses   Name Primary?     Benign essential hypertension      Hyperlipidemia LDL goal <70      Coronary artery disease involving native coronary artery of native heart without angina pectoris Yes       CURRENT MEDICATIONS:  Current Outpatient Prescriptions   Medication Sig Dispense Refill     metoprolol (TOPROL-XL) 50 MG 24 hr tablet Take 1 tablet (50 mg) by mouth every morning 90 tablet 3     lisinopril (PRINIVIL,ZESTRIL) 30 MG tablet Take 1 tablet (30 mg) by mouth daily 90 tablet 3     atorvastatin (LIPITOR) 80 MG tablet Take 1 tablet (80 mg) by mouth daily 90 tablet 3     ezetimibe (ZETIA) 10 MG tablet Take 1 tablet (10 mg) by mouth daily 90 tablet 3     nitroglycerin (NITROSTAT) 0.4 MG SL tablet One tablet under the tongue every 5 minutes if needed for chest pain. May repeat every 5 minutes for a maximum of 3 doses in 15 minutes\" 25 tablet 3     aspirin 81 MG EC tablet Take 1 tablet (81 mg) by mouth daily Start tomorrow morning. 90 tablet 3     calcium-vitamin D 500-125 MG-UNIT TABS        " [DISCONTINUED] metoprolol (TOPROL-XL) 50 MG 24 hr tablet Take 1 tablet (50 mg) by mouth every morning 90 tablet 3     [DISCONTINUED] lisinopril (PRINIVIL,ZESTRIL) 30 MG tablet Take 1 tablet (30 mg) by mouth daily 90 tablet 3     [DISCONTINUED] atorvastatin (LIPITOR) 80 MG tablet Take 1 tablet (80 mg) by mouth daily 90 tablet 3       ALLERGIES     Allergies   Allergen Reactions     Bupropion Hcl      rash     Cyclobenzaprine Hives       PAST MEDICAL HISTORY:  Past Medical History:   Diagnosis Date     Abnormal stress ECG      Fibromyalgia      Hyperlipidemia      Hypertension      MI (myocardial infarction) (H) age 51    MI in 2001     Myocardial infarction (H)        PAST SURGICAL HISTORY:  Past Surgical History:   Procedure Laterality Date     COLONOSCOPY  2011    Procedure:COLONOSCOPY; Screening Colonoscopy; Surgeon:ALISHA JOY; Location:[place] [facility]     D & C  [facility]    D&C     SURGICAL HISTORY OF -       Status post MI,      SURGICAL HISTORY OF -       Stents x2       FAMILY HISTORY:  Family History   Problem Relation Age of Onset     HEART DISEASE Mother      congenital,  at 82     DIABETES Mother      GASTROINTESTINAL DISEASE Mother      colon polyps non cncerous     HEART DISEASE Father      first MI in mid-50s     CEREBROVASCULAR DISEASE Father       at 61     Neurologic Disorder Maternal Grandfather      brain hemmorage     HEART DISEASE Brother       of MI in early 50s     Hypertension Daughter      CANCER Sister      endometrial cancer     GASTROINTESTINAL DISEASE Sister      colon polyps non cancerous       SOCIAL HISTORY:  Social History     Social History     Marital status:      Spouse name: N/A     Number of children: N/A     Years of education: N/A     Social History Main Topics     Smoking status: Former Smoker     Quit date: 10/13/2001     Smokeless tobacco: Never Used     Alcohol use Yes      Comment: rarely     Drug use: No     Sexual activity: Yes     Partners:  Male     Other Topics Concern     Parent/Sibling W/ Cabg, Mi Or Angioplasty Before 65f 55m? Yes     father and brother     Social History Narrative        Daughter with severe hypertension    Healthy son    Works as chemical dependency technician       Review of Systems:  Skin:  Negative       Eyes:  Positive for glasses    ENT:  Negative      Respiratory:  Negative       Cardiovascular:  Negative      Gastroenterology: Positive for heartburn    Genitourinary:  Negative      Musculoskeletal:  Positive for fibromyalgia;joint pain;joint swelling;joint stiffness    Neurologic:  Positive for numbness or tingling of hands    Psychiatric:  Negative      Heme/Lymph/Imm:  Negative      Endocrine:  Negative        Physical Exam:  Vitals: /84  Pulse 70  Wt 89.6 kg (197 lb 9.6 oz)  SpO2 95%  BMI 39.91 kg/m2    Constitutional:  cooperative, alert and oriented, well developed, well nourished, in no acute distress overweight      Skin:  warm and dry to the touch        Head:  normocephalic        Eyes:  sclera white        ENT:  no pallor or cyanosis        Neck:  carotid pulses are full and equal bilaterally        Chest:    prolonged expiration        Cardiac: regular rhythm;normal S1 and S2   S4   systolic murmur;LUSB;grade 1          Abdomen:  abdomen soft        Vascular: pulses full and equal                                        Extremities and Back:  no edema              Neurological:  no gross motor deficits;affect appropriate            Thank you for allowing me to participate in the care of your patient.    Sincerely,     TIMOTHY Rooney [facility]

## 2017-09-29 NOTE — PATIENT INSTRUCTIONS
Thank you for your [facility] visit today. Your provider has recommended the following:  Medication Changes:  No changes   Recommendations:  Call the clinic if your blood pressure is consistently greater than 140/90.   Follow-up:  See Dr. Tovar for cardiology follow up in 4 months with nonfasting lab work.  Call [phone] two months prior to request date to schedule any future appointments.  Reminder:  1. Please bring in all current medications, over the counter supplements and vitamin bottles to your next appointment.               [facility]  [facility]~[address]~[place], [place]~[address]  Questions about your visit today?   Call your Cardiology Clinic RN's-Akilah Gabriel and/or Rocio Mckenzie at [phone].

## 2017-09-29 NOTE — MR AVS SNAPSHOT
After Visit Summary   9/29/2017    Sandra K Severson    MRN: [id number]           Patient Information     Date Of Birth          1950        Visit Information        Provider Department      9/29/2017 11:30 AM Mary Jenkins APRN CNP [facility] PHYSICIAN HEART AT [facility]        Today's Diagnoses     Coronary artery disease involving native coronary artery, angina presence unspecified, unspecified whether native or transplanted heart        Benign essential hypertension        Hyperlipidemia LDL goal <70          Care Instructions    Thank you for your [facility] visit today. Your provider has recommended the following:  Medication Changes:  No changes   Recommendations:  Call the clinic if your blood pressure is consistently greater than 140/90.   Follow-up:  See Dr. Tovar for cardiology follow up in 4 months with nonfasting lab work.  Call [phone] two months prior to request date to schedule any future appointments.  Reminder:  1. Please bring in all current medications, over the counter supplements and vitamin bottles to your next appointment.               [facility]~[address]~[place], [place]~[address]  Questions about your visit today?   Call your Cardiology Clinic RN's-Akilah Gabriel and/or Rocio Mckenzie at [phone].            Follow-ups after your visit        Additional Services     Follow-Up with Cardiologist                 Future tests that were ordered for you today     Open Future Orders        Priority Expected Expires Ordered    Follow-Up with Cardiologist Routine 1/29/2018 9/29/2019 9/29/2017    Lipid Profile Routine 1/29/2018 9/29/2018 9/29/2017    ALT Routine 10/29/2017 9/29/2018 9/29/2017    Basic metabolic panel Routine 1/29/2018 9/29/2018 9/29/2017            Who to contact     If you have questions or need follow up information about today's clinic visit or your  "schedule please contact [facility] PHYSICIAN HEART AT [facility] directly at [phone].  Normal or non-critical lab and imaging results will be communicated to you by MyChart, letter or phone within 4 business days after the clinic has received the results. If you do not hear from us within 7 days, please contact the clinic through [employer] or phone. If you have a critical or abnormal lab result, we will notify you by phone as soon as possible.  Submit refill requests through [employer] or call your pharmacy and they will forward the refill request to us. Please allow 3 business days for your refill to be completed.          Additional Information About Your Visit        [employer] Information     Zhihu lets you send messages to your doctor, view your test results, renew your prescriptions, schedule appointments and more. To sign up, go to [email or web address] . Click on \"Log in\" on the left side of the screen, which will take you to the Welcome page. Then click on \"Sign up Now\" on the right side of the page.     You will be asked to enter the access code listed below, as well as some personal information. Please follow the directions to create your username and password.     Your access code is: [email or web address]  Expires: 2017 10:26 AM     Your access code will  in 90 days. If you need help or a new code, please call your [place] clinic or [phone].        Care EveryWhere ID     This is your Care EveryWhere ID. This could be used by other organizations to access your [place] medical records  [id number]        Your Vitals Were     Pulse Pulse Oximetry BMI (Body Mass Index)             70 95% 39.91 kg/m2          Blood Pressure from Last 3 Encounters:   17 152/79   17 116/75   09/10/16 141/83    Weight from Last 3 Encounters:   17 89.6 kg (197 lb 9.6 oz)   17 87.1 kg (192 lb)   16 84.8 kg (187 lb)              We Performed the Following     Follow-Up " with Cardiologist          Where to get your medicines      [facility] [address], [facility] [address]     Phone:  [phone]     atorvastatin 80 MG tablet    lisinopril 30 MG tablet    metoprolol 50 MG 24 hr tablet          Primary Care Provider Office Phone # Fax #    Freya Maria Teresa Lopez [phone] [address] [address]        Equal Access to Services     [facility]: Hadii aad ku hadasho Soomaali, waaxda luqadaha, qaybta kaalmada adeegyada, waxay idiin haykelsey adenichole khcami barron . So [facility] [phone].    ATENCIÓN: Si habla español, tiene a romero disposición servicios gratuitos de asistencia lingüística. [facility] [phone].    We comply with applicable federal civil rights laws and [place] laws. We do not discriminate on the basis of race, color, national origin, age, disability sex, sexual orientation or gender identity.            Thank you!     Thank you for choosing [facility] PHYSICIAN HEART AT [facility]  for your care. Our goal is always to provide you with excellent care. Hearing back from our patients is one way we can continue to improve our services. Please take a few minutes to complete the written survey that you may receive in the mail after your visit with us. Thank you!             Your Updated Medication List - Protect others around you: Learn how to safely use, store and throw away your medicines at www.disposemymeds.org.          This list is accurate as of: 9/29/17 12:03 PM.  Always use your most recent med list.                   Brand Name Dispense Instructions for use Diagnosis    aspirin 81 MG EC tablet     90 tablet    Take 1 tablet (81 mg) by mouth daily Start tomorrow morning.    Postsurgical percutaneous transluminal coronary angioplasty status, Coronary artery disease involving native coronary artery of native  "heart with angina pectoris (H), Status post coronary angioplasty       atorvastatin 80 MG tablet    LIPITOR    90 tablet    Take 1 tablet (80 mg) by mouth daily    Hyperlipidemia LDL goal <70       calcium-vitamin D 500-125 MG-UNIT Tabs           ezetimibe 10 MG tablet    ZETIA    90 tablet    Take 1 tablet (10 mg) by mouth daily    Hyperlipidemia LDL goal <70       lisinopril 30 MG tablet    PRINIVIL,ZESTRIL    90 tablet    Take 1 tablet (30 mg) by mouth daily    Benign essential hypertension       metoprolol 50 MG 24 hr tablet    TOPROL-XL    90 tablet    Take 1 tablet (50 mg) by mouth every morning    Coronary artery disease involving native coronary artery, angina presence unspecified, unspecified whether native or transplanted heart       nitroGLYcerin 0.4 MG sublingual tablet    NITROSTAT    25 tablet    One tablet under the tongue every 5 minutes if needed for chest pain. May repeat every 5 minutes for a maximum of 3 doses in 15 minutes\"    Postsurgical percutaneous transluminal coronary angioplasty status, Coronary artery disease involving native coronary artery of native heart with angina pectoris (H), Status post coronary angioplasty         "

## 2017-09-29 NOTE — PROGRESS NOTES
Injectable Influenza Immunization Documentation    1.  Is the person to be vaccinated sick today?   No    2. Does the person to be vaccinated have an allergy to a component   of the vaccine?   No    3. Has the person to be vaccinated ever had a serious reaction   to influenza vaccine in the past?   No    4. Has the person to be vaccinated ever had Guillain-Barré syndrome?   No    Form completed by kari

## 2018-01-08 DIAGNOSIS — E78.5 HYPERLIPIDEMIA LDL GOAL <70: ICD-10-CM

## 2018-01-08 DIAGNOSIS — I25.10 CORONARY ARTERY DISEASE INVOLVING NATIVE CORONARY ARTERY, ANGINA PRESENCE UNSPECIFIED, UNSPECIFIED WHETHER NATIVE OR TRANSPLANTED HEART: Primary | ICD-10-CM

## 2018-01-08 DIAGNOSIS — I10 BENIGN ESSENTIAL HYPERTENSION: ICD-10-CM

## 2018-01-08 RX ORDER — ATORVASTATIN CALCIUM 80 MG/1
80 TABLET, FILM COATED ORAL DAILY
Qty: 90 TABLET | Refills: 0 | Status: SHIPPED | OUTPATIENT
Start: 2018-01-08 | End: 2018-02-07

## 2018-01-08 RX ORDER — EZETIMIBE 10 MG/1
10 TABLET ORAL DAILY
Qty: 90 TABLET | Refills: 0 | Status: SHIPPED | OUTPATIENT
Start: 2018-01-08 | End: 2018-02-07

## 2018-01-08 RX ORDER — METOPROLOL SUCCINATE 50 MG/1
50 TABLET, EXTENDED RELEASE ORAL EVERY MORNING
Qty: 90 TABLET | Refills: 0 | Status: SHIPPED | OUTPATIENT
Start: 2018-01-08 | End: 2018-02-07

## 2018-01-08 RX ORDER — LISINOPRIL 30 MG/1
30 TABLET ORAL DAILY
Qty: 90 TABLET | Refills: 0 | Status: SHIPPED | OUTPATIENT
Start: 2018-01-08 | End: 2018-02-07

## 2018-01-30 ENCOUNTER — TRANSFERRED RECORDS (OUTPATIENT)
Dept: HEALTH INFORMATION MANAGEMENT | Facility: CLINIC | Age: 68
End: 2018-01-30

## 2018-02-07 ENCOUNTER — OFFICE VISIT (OUTPATIENT)
Dept: CARDIOLOGY | Facility: CLINIC | Age: 68
End: 2018-02-07
Attending: NURSE PRACTITIONER
Payer: COMMERCIAL

## 2018-02-07 VITALS
SYSTOLIC BLOOD PRESSURE: 131 MMHG | WEIGHT: 191 LBS | OXYGEN SATURATION: 97 % | BODY MASS INDEX: 38.58 KG/M2 | HEART RATE: 75 BPM | DIASTOLIC BLOOD PRESSURE: 85 MMHG

## 2018-02-07 DIAGNOSIS — E78.5 HYPERLIPIDEMIA LDL GOAL <70: ICD-10-CM

## 2018-02-07 DIAGNOSIS — I25.10 CORONARY ARTERY DISEASE INVOLVING NATIVE CORONARY ARTERY, ANGINA PRESENCE UNSPECIFIED, UNSPECIFIED WHETHER NATIVE OR TRANSPLANTED HEART: ICD-10-CM

## 2018-02-07 DIAGNOSIS — I10 BENIGN ESSENTIAL HYPERTENSION: ICD-10-CM

## 2018-02-07 PROCEDURE — 99214 OFFICE O/P EST MOD 30 MIN: CPT | Performed by: INTERNAL MEDICINE

## 2018-02-07 RX ORDER — EZETIMIBE 10 MG/1
10 TABLET ORAL DAILY
Qty: 90 TABLET | Refills: 1 | Status: SHIPPED | OUTPATIENT
Start: 2018-02-07 | End: 2018-02-26

## 2018-02-07 RX ORDER — ATORVASTATIN CALCIUM 80 MG/1
80 TABLET, FILM COATED ORAL DAILY
Qty: 90 TABLET | Refills: 1 | Status: SHIPPED | OUTPATIENT
Start: 2018-02-07 | End: 2018-08-20

## 2018-02-07 RX ORDER — METOPROLOL SUCCINATE 50 MG/1
50 TABLET, EXTENDED RELEASE ORAL EVERY MORNING
Qty: 90 TABLET | Refills: 1 | Status: SHIPPED | OUTPATIENT
Start: 2018-02-07 | End: 2018-09-13

## 2018-02-07 RX ORDER — LISINOPRIL 30 MG/1
30 TABLET ORAL DAILY
Qty: 90 TABLET | Refills: 1 | Status: SHIPPED | OUTPATIENT
Start: 2018-02-07 | End: 2018-09-13

## 2018-02-07 NOTE — MR AVS SNAPSHOT
After Visit Summary   2/7/2018    Sandra K Severson    MRN: [id number]           Patient Information     Date Of Birth          1950        Visit Information        Provider Department      2/7/2018 11:00 AM Randy Tovar MD [facility]        Today's Diagnoses     Benign essential hypertension        Hyperlipidemia LDL goal <70        Coronary artery disease involving native coronary artery, angina presence unspecified, unspecified whether native or transplanted heart          Care Instructions    [facility]~[address]~[place], [place]~[address]  Thank you for your  Heart Care visit today. If you have questions regarding your visit, please contact your cardiology RN's, Akilah Gabriel or Rocio Mckenzie, at [phone]. Your provider has recommended the following:  Medication Changes:   None today. Continue taking your medications as you have been.  Recommendations:  1. As discussed at your visit today with Dr. Tovar.  2. [place] is close to [place], [place]. Cardiologists from [facility] see patients there. Call [phone] to schedule an appointment if interested.  3. Dr. Tovar has given you refills of your cardiac meds today. 90 tablets with one refill. This will give you time to establish care with another provider closer to where you live.  Follow-up:  As above. I have given you a copy of your most recent Stress Test. Echocardiogram and labs today.  To schedule a future appointment, we kindly ask that you call cardiology scheduling at [phone] three months prior to requested revisit date.               Reminder:  For your safety, we ask that you bring in your current medication(s) or an updated list of your medications with you to EACH office visit. Include the medication name, dose of pill on bottle and how you are taking it. Include over-the-counter medications or  "supplements. Your provider will review this at each visit and plan your care based on your current information.               Follow-ups after your visit        Who to contact     If you have questions or need follow up information about today's clinic visit or your schedule please contact [facility] directly at [phone].  Normal or non-critical lab and imaging results will be communicated to you by MyChart, letter or phone within 4 business days after the clinic has received the results. If you do not hear from us within 7 days, please contact the clinic through [facility] or phone. If you have a critical or abnormal lab result, we will notify you by phone as soon as possible.  Submit refill requests through [employer] or call your pharmacy and they will forward the refill request to us. Please allow 3 business days for your refill to be completed.          Additional Information About Your Visit        MyChart Information     [employer] lets you send messages to your doctor, view your test results, renew your prescriptions, schedule appointments and more. To sign up, go to [email or web address] . Click on \"Log in\" on the left side of the screen, which will take you to the Welcome page. Then click on \"Sign up Now\" on the right side of the page.     You will be asked to enter the access code listed below, as well as some personal information. Please follow the directions to create your username and password.     Your access code is: [email or web address]  Expires: 2018 12:00 PM     Your access code will  in 90 days. If you need help or a new code, please call your [place] clinic or [phone].        Care EveryWhere ID     This is your Care EveryWhere ID. This could be used by other organizations to access your [place] medical records  [id number]        Your Vitals Were     Pulse Pulse Oximetry BMI (Body Mass Index)             75 97% 38.58 kg/m2          Blood Pressure " from Last 3 Encounters:   02/07/18 131/85   09/29/17 148/84   03/06/17 116/75    Weight from Last 3 Encounters:   02/07/18 86.6 kg (191 lb)   09/29/17 89.6 kg (197 lb 9.6 oz)   03/06/17 87.1 kg (192 lb)              We Performed the Following     Follow-Up with Cardiologist          Where to get your medicines      These medications were sent to THE [address] [address]     Phone:  [phone]     atorvastatin 80 MG tablet    ezetimibe 10 MG tablet    lisinopril 30 MG tablet    metoprolol succinate 50 MG 24 hr tablet          Primary Care Provider    Freya Lopez MD       No address on file        Equal Access to Services     [facility]: Popeye Chapman, wacarlo polanco, qaybkeri kaalmapetra siddiqui, dominique barron . So [facility] [phone].    ATENCIÓN: Si habla español, tiene a romero disposición servicios gratuitos de asistencia lingüística. [facility] [phone].    We comply with applicable federal civil rights laws and [place] laws. We do not discriminate on the basis of race, color, national origin, age, disability, sex, sexual orientation, or gender identity.            Thank you!     Thank you for choosing [facility]  for your care. Our goal is always to provide you with excellent care. Hearing back from our patients is one way we can continue to improve our services. Please take a few minutes to complete the written survey that you may receive in the mail after your visit with us. Thank you!             Your Updated Medication List - Protect others around you: Learn how to safely use, store and throw away your medicines at www.disposemymeds.org.          This list is accurate as of 2/7/18 12:02 PM.  Always use your most recent med list.                   Brand Name Dispense Instructions for use Diagnosis    aspirin 81 MG EC tablet     90 tablet    Take 1 tablet (81  "mg) by mouth daily Start tomorrow morning.    Postsurgical percutaneous transluminal coronary angioplasty status, Coronary artery disease involving native coronary artery of native heart with angina pectoris (H), Status post coronary angioplasty       atorvastatin 80 MG tablet    LIPITOR    90 tablet    Take 1 tablet (80 mg) by mouth daily    Hyperlipidemia LDL goal <70       calcium-vitamin D 500-125 MG-UNIT Tabs           ezetimibe 10 MG tablet    ZETIA    90 tablet    Take 1 tablet (10 mg) by mouth daily    Hyperlipidemia LDL goal <70       lisinopril 30 MG tablet    PRINIVIL,ZESTRIL    90 tablet    Take 1 tablet (30 mg) by mouth daily    Benign essential hypertension       metoprolol succinate 50 MG 24 hr tablet    TOPROL-XL    90 tablet    Take 1 tablet (50 mg) by mouth every morning    Benign essential hypertension, Coronary artery disease involving native coronary artery, angina presence unspecified, unspecified whether native or transplanted heart       nitroGLYcerin 0.4 MG sublingual tablet    NITROSTAT    25 tablet    One tablet under the tongue every 5 minutes if needed for chest pain. May repeat every 5 minutes for a maximum of 3 doses in 15 minutes\"    Postsurgical percutaneous transluminal coronary angioplasty status, Coronary artery disease involving native coronary artery of native heart with angina pectoris (H), Status post coronary angioplasty         "

## 2018-02-07 NOTE — LETTER
2/7/2018      Freya Lopez MD  No address on file      RE: Christine RODAS Severson       Dear Colleague,    I had the pleasure of seeing Sandra K Severson in the [facility].    Service Date: 02/07/2018      HISTORY OF PRESENT ILLNESS:  The patient is a 67-year-old moderately overweight white female with a history of ischemic heart disease dating back to 2001 when she had an inferior wall myocardial infarction.  She was treated with tPA but ended up undergoing coronary angiography with thrombectomy and 2 stents placed in the right coronary artery.  She was not noted to have other significant disease at that time.  She had a history of hyperlipidemia.  She had increased symptoms of fatigue and chest tightness in 2015, stress test was abnormal, after which she had metoprolol therapy added.  Symptoms did not improve on metoprolol therapy, so it was arranged for her to undergo cardiac catheterization and coronary angiography in 03/2016.  She had a mid-circumflex narrowing of 80% and was treated with a 3.5 x 20 mm drug-eluting stent with minimal residual.  There was minimal calcification of the left anterior descending coronary artery with 40% proximal narrowing in the right coronary artery and 40% distal right coronary artery stenosis and 60% mid right coronary artery narrowing with a normal FFR.  The stent in the right coronary artery was felt to be fully patent.  There was no left ventriculogram performed.  She had a normal ejection fraction from stress testing in 2015 at 60%.  She has been able to participate in most activities without significant restriction.  She has had a Lexiscan Cardiolite stress test which has showed a slight decrease in inferior apical activity, most likely related to soft-tissue attenuation and bowel uptake artifact with no evidence of significant ischemia or infarct.  Ejection fraction was 64%, which was an improvement from 2015.  Echocardiography in 2016 showed  an ejection fraction of 50%-55% with slight basilar inferolateral hypokinesis but no other significant valvular disease or regional wall motion abnormality present.      MEDICATIONS:   1.  Lisinopril 30 mg a day.   2.  Atorvastatin 80 mg a day.   3.  Metoprolol succinate 50 mg a day.   4.  Zetia 10 mg a day.   5.  Nitroglycerin 0.4 mg sublingual p.r.n., which has not been used.   6.  Aspirin 81 mg a day.   7.  Calcium and vitamin D 1 tablet a day.      LABORATORY DATA:  Most recent laboratory data was from the fall of 2017, which showed a cholesterol 137, HDL 65, LDL 59, triglycerides 64, sodium 144, potassium 5.0, BUN 15, creatinine 0.64.  ALT 31.      The patient presents to Cardiology Clinic for followup of her ischemic heart disease.  It should be noted that she is moving to [place] and will need other primary care and Cardiology followup.      PHYSICAL EXAMINATION:   VITAL SIGNS:  Blood pressure 131/85 with a heart rate of 70-80 and regular.  Weight was 191 pounds, which is stable.   NECK:  Without jugular venous distention, carotid bruit or palpable thyroid.   CHEST:  Essentially clear to percussion and auscultation with slight decreased breath sounds at the bases.   CARDIAC:  Regular rhythm, soft S4 gallop.  There is a 1 to 2/6 systolic murmur at the left sternal border with minimal radiation.  No diastolic murmur, rub or S3.   EXTREMITIES:  Without cyanosis or edema.      CLINICAL IMPRESSION:   1.  Stable cardiac condition.   2.  History of ischemic heart disease, status post inferior wall myocardial infarction in 2001 with PTCA and thrombectomy, PTCA and stent placed in the right coronary artery.   3.  Status post PTCA and stent placement in the circumflex coronary artery in 2016.   4.  Hyperlipidemia, at goal.   5.  Hypertension, at goal.   6.  Overweight.      DISCUSSION:  The patient has done well clinically from the last clinic visit.  She has had no significant symptoms of angina pectoris or  "congestive heart failure.  Cardiolite stress testing has shown no evidence of ischemia or infarct recently.  Her dual antiplatelet therapy was discontinued last summer.  She will need close followup of serum lipids, basic metabolic panel and blood pressure.  Otherwise, she appears to be doing well and seems stable.  She is still trying to lose weight.      RECOMMENDATIONS:   1.  Continue present medications.   2.  Close followup of serum lipids, basic metabolic panel and blood pressure.   3.  Diet and exercise program to lose weight.   4.  Consider followup evaluation of left ventricular function in late  or early .   5.  Routine medical followup.   6.  Cardiology followup in [place].      cc:   Freya Lopez MD    [facility]    [address] [place] [address]  [id number] [place] [address]         RONNY ZEPEDA MD, [facility]             D: 2018   T: 2018   MT: TAD      Name:     SEVERSON, SANDRA   MRN:      [id number]        Account:      [id number]   :      1950           Service Date: 2018      Document: [id number]       Outpatient Encounter Prescriptions as of 2018   Medication Sig Dispense Refill     lisinopril (PRINIVIL,ZESTRIL) 30 MG tablet Take 1 tablet (30 mg) by mouth daily 90 tablet 1     atorvastatin (LIPITOR) 80 MG tablet Take 1 tablet (80 mg) by mouth daily 90 tablet 1     metoprolol succinate (TOPROL-XL) 50 MG 24 hr tablet Take 1 tablet (50 mg) by mouth every morning 90 tablet 1     ezetimibe (ZETIA) 10 MG tablet Take 1 tablet (10 mg) by mouth daily 90 tablet 1     nitroglycerin (NITROSTAT) 0.4 MG SL tablet One tablet under the tongue every 5 minutes if needed for chest pain. May repeat every 5 minutes for a maximum of 3 doses in 15 minutes\" 25 tablet 3     aspirin 81 MG EC tablet Take 1 tablet (81 mg) by mouth daily Start tomorrow morning. 90 tablet 3     calcium-vitamin D 500-125 MG-UNIT TABS    "     [DISCONTINUED] ezetimibe (ZETIA) 10 MG tablet Take 1 tablet (10 mg) by mouth daily 90 tablet 0     [DISCONTINUED] atorvastatin (LIPITOR) 80 MG tablet Take 1 tablet (80 mg) by mouth daily 90 tablet 0     [DISCONTINUED] lisinopril (PRINIVIL,ZESTRIL) 30 MG tablet Take 1 tablet (30 mg) by mouth daily 90 tablet 0     [DISCONTINUED] metoprolol (TOPROL-XL) 50 MG 24 hr tablet Take 1 tablet (50 mg) by mouth every morning 90 tablet 0     No facility-administered encounter medications on file as of 2/7/2018.        Again, thank you for allowing me to participate in the care of your patient.      Sincerely,    Randy Tovar MD     [facility]

## 2018-02-07 NOTE — PATIENT INSTRUCTIONS
[facility] HE[facility] CARE  [facility]~[address]~[place], [place]~[address]  Thank you for your [facility] visit today. If you have questions regarding your visit, please contact your cardiology RN's, Akilah Gabriel or Rocio Mckenzie, at [phone]. Your provider has recommended the following:  Medication Changes:   None today. Continue taking your medications as you have been.  Recommendations:  1. As discussed at your visit today with Dr. Tovar.  2. [place] is close to [place], [place]. Cardiologists from [facility] see patients there. Call [phone] to schedule an appointment if interested.  3. Dr. Tovar has given you refills of your cardiac meds today. 90 tablets with one refill. This will give you time to establish care with another provider closer to where you live.  Follow-up:  As above. I have given you a copy of your most recent Stress Test. Echocardiogram and labs today.  To schedule a future appointment, we kindly ask that you call cardiology scheduling at [phone] three months prior to requested revisit date.               Reminder:  For your safety, we ask that you bring in your current medication(s) or an updated list of your medications with you to EACH office visit. Include the medication name, dose of pill on bottle and how you are taking it. Include over-the-counter medications or supplements. Your provider will review this at each visit and plan your care based on your current information.

## 2018-02-07 NOTE — Clinical Note
2/7/2018    Freya Lopez MD  No address on file    RE: Christine RODAS Severson       Dear Colleague,    I had the pleasure of seeing Sandra K Severson in the [facility].    Service Date: 02/07/2018      HISTORY OF PRESENT ILLNESS:  The patient is a 67-year-old moderately overweight white female with a history of ischemic heart disease dating back to 2001 when she had an inferior wall myocardial infarction.  She was treated with tPA but ended up undergoing coronary angiography with thrombectomy and 2 stents placed in the right coronary artery.  She was not noted to have other significant disease at that time.  She had a history of hyperlipidemia.  She had increased symptoms of fatigue and chest tightness in 2015, stress test was abnormal, after which she had metoprolol therapy added.  Symptoms did not improve on metoprolol therapy, so it was arranged for her to undergo cardiac catheterization and coronary angiography in 03/2016.  She had a mid-circumflex narrowing of 80% and was treated with a 3.5 x 20 mm drug-eluting stent with minimal residual.  There was minimal calcification of the left anterior descending coronary artery with 40% proximal narrowing in the right coronary artery and 40% distal right coronary artery stenosis and 60% mid right coronary artery narrowing with a normal FFR.  The stent in the right coronary artery was felt to be fully patent.  There was no left ventriculogram performed.  She had a normal ejection fraction from stress testing in 2015 at 60%.  She has been able to participate in most activities without significant restriction.  She has had a Lexiscan Cardiolite stress test which has showed a slight decrease in inferior apical activity, most likely related to soft-tissue attenuation and bowel uptake artifact with no evidence of significant ischemia or infarct.  Ejection fraction was 64%, which was an improvement from 2015.  Echocardiography in 2016 showed an  ejection fraction of 50%-55% with slight basilar inferolateral hypokinesis but no other significant valvular disease or regional wall motion abnormality present.      MEDICATIONS:   1.  Lisinopril 30 mg a day.   2.  Atorvastatin 80 mg a day.   3.  Metoprolol succinate 50 mg a day.   4.  Zetia 10 mg a day.   5.  Nitroglycerin 0.4 mg sublingual p.r.n., which has not been used.   6.  Aspirin 81 mg a day.   7.  Calcium and vitamin D 1 tablet a day.      LABORATORY DATA:  Most recent laboratory data was from the fall of 2017, which showed a cholesterol 137, HDL 65, LDL 59, triglycerides 64, sodium 144, potassium 5.0, BUN 15, creatinine 0.64.  ALT 31.      The patient presents to Cardiology Clinic for followup of her ischemic heart disease.  It should be noted that she is moving to [place] and will need other primary care and Cardiology followup.      PHYSICAL EXAMINATION:   VITAL SIGNS:  Blood pressure 131/85 with a heart rate of 70-80 and regular.  Weight was 191 pounds, which is stable.   NECK:  Without jugular venous distention, carotid bruit or palpable thyroid.   CHEST:  Essentially clear to percussion and auscultation with slight decreased breath sounds at the bases.   CARDIAC:  Regular rhythm, soft S4 gallop.  There is a 1 to 2/6 systolic murmur at the left sternal border with minimal radiation.  No diastolic murmur, rub or S3.   EXTREMITIES:  Without cyanosis or edema.      CLINICAL IMPRESSION:   1.  Stable cardiac condition.   2.  History of ischemic heart disease, status post inferior wall myocardial infarction in 2001 with PTCA and thrombectomy, PTCA and stent placed in the right coronary artery.   3.  Status post PTCA and stent placement in the circumflex coronary artery in 2016.   4.  Hyperlipidemia, at goal.   5.  Hypertension, at goal.   6.  Overweight.      DISCUSSION:  The patient has done well clinically from the last clinic visit.  She has had no significant symptoms of angina pectoris or congestive  heart failure.  Cardiolite stress testing has shown no evidence of ischemia or infarct recently.  Her dual antiplatelet therapy was discontinued last summer.  She will need close followup of serum lipids, basic metabolic panel and blood pressure.  Otherwise, she appears to be doing well and seems stable.  She is still trying to lose weight.      RECOMMENDATIONS:   1.  Continue present medications.   2.  Close followup of serum lipids, basic metabolic panel and blood pressure.   3.  Diet and exercise program to lose weight.   4.  Consider followup evaluation of left ventricular function in late  or early .   5.  Routine medical followup.   6.  Cardiology followup in [place].      cc:   Freya Lopez MD    [facility]    [id number] [place] [address]  [id number]       [place] [address]         RONNY ZEPEDA MD, [facility]             D: 2018   T: 2018   MT: TAD      Name:     SEVERSON, SANDRA   MRN:      [id number]        Account:      [id number]   :      1950           Service Date: 2018      Document: [id number]       Thank you for allowing me to participate in the care of your patient.      Sincerely,     Ronny Zepeda MD     [facility]    cc:   Mary Jenkins, TIMOTHY CNP  [address]  [place] [address]

## 2018-02-08 NOTE — PROGRESS NOTES
Service Date: 02/07/2018      HISTORY OF PRESENT ILLNESS:  The patient is a 67-year-old moderately overweight white female with a history of ischemic heart disease dating back to 2001 when she had an inferior wall myocardial infarction.  She was treated with tPA but ended up undergoing coronary angiography with thrombectomy and 2 stents placed in the right coronary artery.  She was not noted to have other significant disease at that time.  She had a history of hyperlipidemia.  She had increased symptoms of fatigue and chest tightness in 2015, stress test was abnormal, after which she had metoprolol therapy added.  Symptoms did not improve on metoprolol therapy, so it was arranged for her to undergo cardiac catheterization and coronary angiography in 03/2016.  She had a mid-circumflex narrowing of 80% and was treated with a 3.5 x 20 mm drug-eluting stent with minimal residual.  There was minimal calcification of the left anterior descending coronary artery with 40% proximal narrowing in the right coronary artery and 40% distal right coronary artery stenosis and 60% mid right coronary artery narrowing with a normal FFR.  The stent in the right coronary artery was felt to be fully patent.  There was no left ventriculogram performed.  She had a normal ejection fraction from stress testing in 2015 at 60%.  She has been able to participate in most activities without significant restriction.  She has had a Lexiscan Cardiolite stress test which has showed a slight decrease in inferior apical activity, most likely related to soft-tissue attenuation and bowel uptake artifact with no evidence of significant ischemia or infarct.  Ejection fraction was 64%, which was an improvement from 2015.  Echocardiography in 2016 showed an ejection fraction of 50%-55% with slight basilar inferolateral hypokinesis but no other significant valvular disease or regional wall motion abnormality present.      MEDICATIONS:   1.  Lisinopril 30 mg a  day.   2.  Atorvastatin 80 mg a day.   3.  Metoprolol succinate 50 mg a day.   4.  Zetia 10 mg a day.   5.  Nitroglycerin 0.4 mg sublingual p.r.n., which has not been used.   6.  Aspirin 81 mg a day.   7.  Calcium and vitamin D 1 tablet a day.      LABORATORY DATA:  Most recent laboratory data was from the fall of 2017, which showed a cholesterol 137, HDL 65, LDL 59, triglycerides 64, sodium 144, potassium 5.0, BUN 15, creatinine 0.64.  ALT 31.      The patient presents to Cardiology Clinic for followup of her ischemic heart disease.  It should be noted that she is moving to [place] and will need other primary care and Cardiology followup.      PHYSICAL EXAMINATION:   VITAL SIGNS:  Blood pressure 131/85 with a heart rate of 70-80 and regular.  Weight was 191 pounds, which is stable.   NECK:  Without jugular venous distention, carotid bruit or palpable thyroid.   CHEST:  Essentially clear to percussion and auscultation with slight decreased breath sounds at the bases.   CARDIAC:  Regular rhythm, soft S4 gallop.  There is a 1 to 2/6 systolic murmur at the left sternal border with minimal radiation.  No diastolic murmur, rub or S3.   EXTREMITIES:  Without cyanosis or edema.      CLINICAL IMPRESSION:   1.  Stable cardiac condition.   2.  History of ischemic heart disease, status post inferior wall myocardial infarction in 2001 with PTCA and thrombectomy, PTCA and stent placed in the right coronary artery.   3.  Status post PTCA and stent placement in the circumflex coronary artery in 2016.   4.  Hyperlipidemia, at goal.   5.  Hypertension, at goal.   6.  Overweight.      DISCUSSION:  The patient has done well clinically from the last clinic visit.  She has had no significant symptoms of angina pectoris or congestive heart failure.  Cardiolite stress testing has shown no evidence of ischemia or infarct recently.  Her dual antiplatelet therapy was discontinued last summer.  She will need close followup of serum lipids,  basic metabolic panel and blood pressure.  Otherwise, she appears to be doing well and seems stable.  She is still trying to lose weight.      RECOMMENDATIONS:   1.  Continue present medications.   2.  Close followup of serum lipids, basic metabolic panel and blood pressure.   3.  Diet and exercise program to lose weight.   4.  Consider followup evaluation of left ventricular function in late  or early .   5.  Routine medical followup.   6.  Cardiology followup in [place].      cc:   [place] [address]         RONNY ZEPEDA MD, FACC             D: 2018   T: 2018   MT: TAD      Name:     SEVERSON, SANDRA   MRN:      [id number]        Account:      [id number]   :      1950           Service Date: 2018      Document: [id number]

## 2018-02-26 ENCOUNTER — TELEPHONE (OUTPATIENT)
Dept: CARDIOLOGY | Facility: CLINIC | Age: 68
End: 2018-02-26

## 2018-02-26 DIAGNOSIS — E78.5 HYPERLIPIDEMIA LDL GOAL <70: ICD-10-CM

## 2018-02-26 RX ORDER — EZETIMIBE 10 MG/1
10 TABLET ORAL DAILY
Qty: 90 TABLET | Refills: 3 | Status: SHIPPED | OUTPATIENT
Start: 2018-02-26 | End: 2018-09-13

## 2018-02-26 NOTE — TELEPHONE ENCOUNTER
Pt calls in asking if there are any alternatives to ezetimibe. She retired and has new insurance. She picked up her most recent supply of ezetimibe and was charged $130/30 day supply. She needs to pay 50% of the cost per her insurance. Wonders what she can do. Dr Tovar out of the office this week. Disc GoodRx with patient. She will go on line and check with her pharmacy to see if they accept GoodRx coupons. Will route call to Dr Tovar to see if he has any other ideas. Rocio Mckenzie RN Cardiology February 26, 2018, 11:49 AM    ADDENDUM: Pt called back to say she called the [facility] pharmacy in [place] and while they don't take the GoodRx coupons, they did have another discount coupon she could use and without submitting to her insurance, her monthly price would be $28. She requested I send the script to them. Done. She will check on her other prescriptions to see if she can get a better price there and let me know if she needs her scripts transferred. Rocio Mckenzie RN Cardiology February 26, 2018, 1:10 PM

## 2018-08-20 DIAGNOSIS — E78.5 HYPERLIPIDEMIA LDL GOAL <70: ICD-10-CM

## 2018-08-20 RX ORDER — ATORVASTATIN CALCIUM 80 MG/1
80 TABLET, FILM COATED ORAL DAILY
Qty: 90 TABLET | Refills: 1 | Status: SHIPPED | OUTPATIENT
Start: 2018-08-20 | End: 2018-09-13

## 2018-09-13 ENCOUNTER — OFFICE VISIT (OUTPATIENT)
Dept: CARDIOLOGY | Facility: CLINIC | Age: 68
End: 2018-09-13
Payer: COMMERCIAL

## 2018-09-13 VITALS
BODY MASS INDEX: 38.78 KG/M2 | HEART RATE: 66 BPM | OXYGEN SATURATION: 96 % | DIASTOLIC BLOOD PRESSURE: 81 MMHG | WEIGHT: 192 LBS | SYSTOLIC BLOOD PRESSURE: 138 MMHG

## 2018-09-13 DIAGNOSIS — E78.5 HYPERLIPIDEMIA LDL GOAL <70: ICD-10-CM

## 2018-09-13 DIAGNOSIS — I25.10 CORONARY ARTERY DISEASE INVOLVING NATIVE CORONARY ARTERY OF NATIVE HEART WITHOUT ANGINA PECTORIS: Primary | ICD-10-CM

## 2018-09-13 DIAGNOSIS — I10 BENIGN ESSENTIAL HYPERTENSION: ICD-10-CM

## 2018-09-13 DIAGNOSIS — I25.10 CORONARY ARTERY DISEASE INVOLVING NATIVE CORONARY ARTERY, ANGINA PRESENCE UNSPECIFIED, UNSPECIFIED WHETHER NATIVE OR TRANSPLANTED HEART: ICD-10-CM

## 2018-09-13 LAB
ALT SERPL W P-5'-P-CCNC: 24 U/L (ref 0–50)
ANION GAP SERPL CALCULATED.3IONS-SCNC: 6 MMOL/L (ref 3–14)
BUN SERPL-MCNC: 15 MG/DL (ref 7–30)
CALCIUM SERPL-MCNC: 9 MG/DL (ref 8.5–10.1)
CHLORIDE SERPL-SCNC: 109 MMOL/L (ref 94–109)
CHOLEST SERPL-MCNC: 150 MG/DL
CO2 SERPL-SCNC: 28 MMOL/L (ref 20–32)
CREAT SERPL-MCNC: 0.59 MG/DL (ref 0.52–1.04)
GFR SERPL CREATININE-BSD FRML MDRD: >90 ML/MIN/1.7M2
GLUCOSE SERPL-MCNC: 85 MG/DL (ref 70–99)
HDLC SERPL-MCNC: 59 MG/DL
LDLC SERPL CALC-MCNC: 74 MG/DL
NONHDLC SERPL-MCNC: 91 MG/DL
POTASSIUM SERPL-SCNC: 4.3 MMOL/L (ref 3.4–5.3)
SODIUM SERPL-SCNC: 143 MMOL/L (ref 133–144)
TRIGL SERPL-MCNC: 87 MG/DL

## 2018-09-13 PROCEDURE — 36415 COLL VENOUS BLD VENIPUNCTURE: CPT | Performed by: NURSE PRACTITIONER

## 2018-09-13 PROCEDURE — 99214 OFFICE O/P EST MOD 30 MIN: CPT | Performed by: NURSE PRACTITIONER

## 2018-09-13 PROCEDURE — 80048 BASIC METABOLIC PNL TOTAL CA: CPT | Performed by: NURSE PRACTITIONER

## 2018-09-13 PROCEDURE — 80061 LIPID PANEL: CPT | Performed by: NURSE PRACTITIONER

## 2018-09-13 PROCEDURE — 84460 ALANINE AMINO (ALT) (SGPT): CPT | Performed by: NURSE PRACTITIONER

## 2018-09-13 RX ORDER — METOPROLOL SUCCINATE 50 MG/1
50 TABLET, EXTENDED RELEASE ORAL EVERY MORNING
Qty: 90 TABLET | Refills: 3 | Status: SHIPPED | OUTPATIENT
Start: 2018-09-13 | End: 2019-11-18

## 2018-09-13 RX ORDER — ATORVASTATIN CALCIUM 80 MG/1
80 TABLET, FILM COATED ORAL DAILY
Qty: 90 TABLET | Refills: 3 | Status: SHIPPED | OUTPATIENT
Start: 2018-09-13 | End: 2019-09-24

## 2018-09-13 RX ORDER — EZETIMIBE 10 MG/1
10 TABLET ORAL DAILY
Qty: 90 TABLET | Refills: 3 | Status: SHIPPED | OUTPATIENT
Start: 2018-09-13 | End: 2019-12-05

## 2018-09-13 RX ORDER — LISINOPRIL 30 MG/1
30 TABLET ORAL DAILY
Qty: 90 TABLET | Refills: 3 | Status: SHIPPED | OUTPATIENT
Start: 2018-09-13 | End: 2019-09-27

## 2018-09-13 NOTE — PATIENT INSTRUCTIONS
Thank you for your [facility] visit today. Your provider has recommended the following:  Medication Changes:  No changes   Recommendations:  Call the clinic if your blood pressure is consistently greater than 130/80.     Follow-up:  See Dr. Chanel for cardiology follow up in 6 months (3/2019) with echocardiogram prior.   Call [phone] two months prior to request date to schedule any future appointments.  Reminder:  1. Please bring in all current medications, over the counter supplements and vitamin bottles to your next appointment.               [facility]  [facility]~[address]~[place], [place]~[address]  Questions about your visit today?   Call your Cardiology Clinic RN's-Akilah Gabriel and/or Rocio Mckenzie at [phone].

## 2018-09-13 NOTE — LETTER
9/13/2018    Tad Martinez  [facility] [address]  [facility] [address]    RE: Sandra K Severson       Dear Colleague,    I had the pleasure of seeing Sandra K Severson in the [facility].    Cardiology Clinic Progress Note  Sandra K Severson MRN# [id number]   YOB: 1950 Age: 67 year old     Reason For Visit:  7-month follow-up    Primary Cardiologist:   Dr. Tovar          History of Presenting Illness:    Sandra K Severson is a pleasant 67 year old patient with a past cardiac history significant for CAD status post inferior MI 2001 with thrombectomy and stent to the  RCA, stent to the mid circumflex in 2016, hypertension, and hyperlipidemia.    Pt was last seen by Dr. Tovar 2/7/2018.  She had been doing well without any cardiac symptoms.  Prior stress test showed no ischemia or infarct. Most recent echocardiogram 2016 with LVEF 50-55%, slight basilar inferolateral hypokinesis, no regional wall motion abnormality, and no significant valvular disease. She continued to try and lose weight and six-month follow-up was recommended.      Pt presents today for six-month follow-up.  She has moved to [place] but wishes to continue following with our cardiology group.  She does see her PCP in [facility] where she lives.  BMP today showing normal renal function and electrolytes.  Lipid profile with total cholesterol 150 HDL 59 LDL 74 and triglycerides 87.  These results were reviewed with her today.  Since she was last seen, she has not had any cardiac complaints.  She denies any angina.  She has not been checking her blood pressures at home recently.  Blood pressure today in the clinic is mildly elevated at 138/81.  She has noted that in the past her blood pressures have been elevated when she comes to the clinic but have been well controlled at home.  She will check blood pressures at home and call us if they are greater than 130 systolically. Patient  reports no chest pain, shortness of breath, PND, orthopnea, presyncope, syncope, edema, heart racing, or palpitations.      Current Cardiac Medications    metoprolol XL 50 mg daily  Lisinopril 30 mg daily  Atorvastatin 80 mg daily  Nitroglycerin p.r.n.  Aspirin 81 mg daily   Zetia 10 mg daily                     Assessment and Plan:     Plan  1.  Call the clinic if your blood pressure is consistently greater than 130/80.   2.  Echocardiogram to reassess LV function, per Dr. Tovar  3.  Follow-up with cardiologist in 6 months      1. CAD    status post inferior MI 2001 with stent to the RCA and stent to the midcircumflex in 2016    no angina    Continue statin, aspirin, ACE inhibitor, beta blocker      2. Hypertension    not well controlled in clinic but controlled at home     continue metoprolol, lisinopril      3. Hyperlipidemia    LDL 74 today    continue atorvastatin 80 mg daily and zetia          Thank you for allowing me to participate in this delightful patient's care.      This note was completed in part using Dragon voice recognition software. Although reviewed after completion, some word and grammatical errors may occur.    Mary Jenkins, APRN, CNP           Data:   All laboratory data reviewed      HPI and Plan:   See dictation    Orders Placed This Encounter   Procedures     Follow-Up with Cardiologist     Echocardiogram Complete       Orders Placed This Encounter   Medications     atorvastatin (LIPITOR) 80 MG tablet     Sig: Take 1 tablet (80 mg) by mouth daily     Dispense:  90 tablet     Refill:  3     ezetimibe (ZETIA) 10 MG tablet     Sig: Take 1 tablet (10 mg) by mouth daily     Dispense:  90 tablet     Refill:  3     lisinopril (PRINIVIL,ZESTRIL) 30 MG tablet     Sig: Take 1 tablet (30 mg) by mouth daily     Dispense:  90 tablet     Refill:  3     metoprolol succinate (TOPROL-XL) 50 MG 24 hr tablet     Sig: Take 1 tablet (50 mg) by mouth every morning     Dispense:  90 tablet     Refill:  3  "      Medications Discontinued During This Encounter   Medication Reason     atorvastatin (LIPITOR) 80 MG tablet Reorder     ezetimibe (ZETIA) 10 MG tablet Reorder     lisinopril (PRINIVIL,ZESTRIL) 30 MG tablet Reorder     metoprolol succinate (TOPROL-XL) 50 MG 24 hr tablet Reorder         Encounter Diagnoses   Name Primary?     Coronary artery disease involving native coronary artery of native heart without angina pectoris Yes     Benign essential hypertension      Hyperlipidemia LDL goal <70      Coronary artery disease involving native coronary artery, angina presence unspecified, unspecified whether native or transplanted heart        CURRENT MEDICATIONS:  Current Outpatient Prescriptions   Medication Sig Dispense Refill     aspirin 81 MG EC tablet Take 1 tablet (81 mg) by mouth daily Start tomorrow morning. 90 tablet 3     atorvastatin (LIPITOR) 80 MG tablet Take 1 tablet (80 mg) by mouth daily 90 tablet 3     calcium-vitamin D 500-125 MG-UNIT TABS        ezetimibe (ZETIA) 10 MG tablet Take 1 tablet (10 mg) by mouth daily 90 tablet 3     lisinopril (PRINIVIL,ZESTRIL) 30 MG tablet Take 1 tablet (30 mg) by mouth daily 90 tablet 3     metoprolol succinate (TOPROL-XL) 50 MG 24 hr tablet Take 1 tablet (50 mg) by mouth every morning 90 tablet 3     nitroglycerin (NITROSTAT) 0.4 MG SL tablet One tablet under the tongue every 5 minutes if needed for chest pain. May repeat every 5 minutes for a maximum of 3 doses in 15 minutes\" 25 tablet 3     [DISCONTINUED] atorvastatin (LIPITOR) 80 MG tablet Take 1 tablet (80 mg) by mouth daily 90 tablet 1     [DISCONTINUED] ezetimibe (ZETIA) 10 MG tablet Take 1 tablet (10 mg) by mouth daily 90 tablet 3     [DISCONTINUED] lisinopril (PRINIVIL,ZESTRIL) 30 MG tablet Take 1 tablet (30 mg) by mouth daily 90 tablet 1     [DISCONTINUED] metoprolol succinate (TOPROL-XL) 50 MG 24 hr tablet Take 1 tablet (50 mg) by mouth every morning 90 tablet 1       ALLERGIES     Allergies   Allergen " Reactions     Bupropion Hcl      rash     Cyclobenzaprine Hives       PAST MEDICAL HISTORY:  Past Medical History:   Diagnosis Date     Abnormal stress ECG      Fibromyalgia      Hyperlipidemia      Hypertension      MI (myocardial infarction) age 51    MI in 2001     Myocardial infarction        PAST SURGICAL HISTORY:  Past Surgical History:   Procedure Laterality Date     COLONOSCOPY  2011    Procedure:COLONOSCOPY; Screening Colonoscopy; Surgeon:ALISHA JOY; Location:[facility]     D & C  1980s    D&C     SURGICAL HISTORY OF -   2001    Status post MI,      SURGICAL HISTORY OF -       Stents x2       FAMILY HISTORY:  Family History   Problem Relation Age of Onset     HEART DISEASE Mother      congenital,  at 82     Diabetes Mother      GASTROINTESTINAL DISEASE Mother      colon polyps non cncerous     HEART DISEASE Father      first MI in mid-50s     Cerebrovascular Disease Father       at 61     Neurologic Disorder Maternal Grandfather      brain hemmorage     HEART DISEASE Brother       of MI in early 50s     Hypertension Daughter      Cancer Sister      endometrial cancer     GASTROINTESTINAL DISEASE Sister      colon polyps non cancerous       SOCIAL HISTORY:  Social History     Social History     Marital status:      Spouse name: N/A     Number of children: N/A     Years of education: N/A     Social History Main Topics     Smoking status: Former Smoker     Quit date: 10/13/2001     Smokeless tobacco: Never Used     Alcohol use Yes      Comment: rarely     Drug use: No     Sexual activity: Yes     Partners: Male     Other Topics Concern     Parent/Sibling W/ Cabg, Mi Or Angioplasty Before 65f 55m? Yes     father and brother     Social History Narrative        Daughter with severe hypertension    Healthy son    Works as chemical dependency technician       Review of Systems:  Skin:  Negative bruising;rash See HPI   Eyes:  Positive for glasses    ENT:  Positive for nasal  congestion    Respiratory:  Negative for cough;shortness of breath     Cardiovascular:  Negative for;chest pain;palpitations;edema;syncope or near-syncope;fatigue;lightheadedness;dizziness      Gastroenterology: Negative for nausea;vomiting;heartburn    Genitourinary:  Negative      Musculoskeletal:  Positive for fibromyalgia;joint pain;joint swelling;joint stiffness    Neurologic:  Positive for numbness or tingling of hands    Psychiatric:  Negative      Heme/Lymph/Imm:  Negative      Endocrine:  Negative        Physical Exam:  Vitals: /81 (BP Location: Right arm, Cuff Size: Adult Large)  Pulse 66  Wt 87.1 kg (192 lb)  SpO2 96%  BMI 38.78 kg/m2    Constitutional:  cooperative, alert and oriented, well developed, well nourished, in no acute distress overweight      Skin:  warm and dry to the touch          Head:  normocephalic        Eyes:  sclera white        Lymph:      ENT:  no pallor or cyanosis        Neck:  no stiffness        Respiratory:  normal breath sounds, clear to auscultation, normal A-P diameter, normal symmetry, normal respiratory excursion, no use of accessory muscles         Cardiac: regular rhythm;normal S1 and S2   S4   systolic murmur;LUSB;grade 1        pulses full and equal                                        GI:  abdomen soft        Extremities and Muscular Skeletal:  no edema              Neurological:  affect appropriate        Psych:  Alert and Oriented x 3          Thank you for allowing me to participate in the care of your patient.    Sincerely,     TIMOTHY Sparks [facility]

## 2018-09-13 NOTE — MR AVS SNAPSHOT
After Visit Summary   9/13/2018    Sandra K Severson    MRN: [id number]           Patient Information     Date Of Birth          1950        Visit Information        Provider Department      9/13/2018 11:00 AM Mary Jenkins APRN CNP [facility]        Today's Diagnoses     Coronary artery disease involving native coronary artery of native heart without angina pectoris    -  1    Benign essential hypertension        Hyperlipidemia LDL goal <70        Coronary artery disease involving native coronary artery, angina presence unspecified, unspecified whether native or transplanted heart          Care Instructions    Thank you for your [facility] today. Your provider has recommended the following:  Medication Changes:  No changes   Recommendations:  Call the clinic if your blood pressure is consistently greater than 130/80.     Follow-up:  See Dr. Chanel for cardiology follow up in 6 months (3/2019) with echocardiogram prior.   Call [phone] two months prior to request date to schedule any future appointments.  Reminder:  1. Please bring in all current medications, over the counter supplements and vitamin bottles to your next appointment.               [facility]~[address]~[place], [place]~[address]  Questions about your visit today?   Call your Cardiology Clinic RN's-Akilah Gabriel and/or Rocio Mckenzie at [phone].            Follow-ups after your visit        Additional Services     Follow-Up with Cardiologist                 Future tests that were ordered for you today     Open Future Orders        Priority Expected Expires Ordered    Follow-Up with Cardiologist Routine 3/13/2019 9/12/2020 9/13/2018    Echocardiogram Complete Routine 3/13/2019 9/13/2019 9/13/2018            Who to contact     If you have questions or need follow up information about today's clinic  "visit or your schedule please contact [facility] directly at [phone].  Normal or non-critical lab and imaging results will be communicated to you by MyChart, letter or phone within 4 business days after the clinic has received the results. If you do not hear from us within 7 days, please contact the clinic through MyChart or phone. If you have a critical or abnormal lab result, we will notify you by phone as soon as possible.  Submit refill requests through [employer] or call your pharmacy and they will forward the refill request to us. Please allow 3 business days for your refill to be completed.          Additional Information About Your Visit        MyChart Information     [employer] lets you send messages to your doctor, view your test results, renew your prescriptions, schedule appointments and more. To sign up, go to [email or web address] . Click on \"Log in\" on the left side of the screen, which will take you to the Welcome page. Then click on \"Sign up Now\" on the right side of the page.     You will be asked to enter the access code listed below, as well as some personal information. Please follow the directions to create your username and password.     Your access code is: [email or web address]  Expires: 2018  9:14 AM     Your access code will  in 90 days. If you need help or a new code, please call your [place] clinic or [phone].        Care EveryWhere ID     This is your Care EveryWhere ID. This could be used by other organizations to access your [facility] medical records  [id number]        Your Vitals Were     Pulse Pulse Oximetry BMI (Body Mass Index)             66 96% 38.78 kg/m2          Blood Pressure from Last 3 Encounters:   18 138/81   18 131/85   17 148/84    Weight from Last 3 Encounters:   18 87.1 kg (192 lb)   18 86.6 kg (191 lb)   17 89.6 kg (197 lb 9.6 oz)                 Where to get your medicines    "   These medications were sent to [facility] [id number] [address]     Phone:  [phone]     atorvastatin 80 MG tablet    ezetimibe 10 MG tablet    lisinopril 30 MG tablet    metoprolol succinate 50 MG 24 hr tablet          Primary Care Provider Office Phone # Fax #    Tad Martinez [phone]       [facility] [address]  [facility] [address]        Equal Access to Services     [facility] : Hadii aad ku hadasho Soomaali, waaxda luqadaha, qaybta kaalmada adeegyada, waxay idiin haykelsey barron . So [facility] [phone].    ATENCIÓN: Si habla español, tiene a romero disposición servicios gratuitos de asistencia lingüística. [facility] [phone].    We comply with applicable federal civil rights laws and [place] laws. We do not discriminate on the basis of race, color, national origin, age, disability, sex, sexual orientation, or gender identity.            Thank you!     Thank you for choosing [facility]  for your care. Our goal is always to provide you with excellent care. Hearing back from our patients is one way we can continue to improve our services. Please take a few minutes to complete the written survey that you may receive in the mail after your visit with us. Thank you!             Your Updated Medication List - Protect others around you: Learn how to safely use, store and throw away your medicines at www.disposemymeds.org.          This list is accurate as of 9/13/18 11:29 AM.  Always use your most recent med list.                   Brand Name Dispense Instructions for use Diagnosis    aspirin 81 MG EC tablet     90 tablet    Take 1 tablet (81 mg) by mouth daily Start tomorrow morning.    Postsurgical percutaneous transluminal coronary angioplasty status, Coronary artery disease involving native coronary artery of native heart with angina pectoris (H), Status post  "coronary angioplasty       atorvastatin 80 MG tablet    LIPITOR    90 tablet    Take 1 tablet (80 mg) by mouth daily    Hyperlipidemia LDL goal <70       calcium-vitamin D 500-125 MG-UNIT Tabs           ezetimibe 10 MG tablet    ZETIA    90 tablet    Take 1 tablet (10 mg) by mouth daily    Hyperlipidemia LDL goal <70       lisinopril 30 MG tablet    PRINIVIL,ZESTRIL    90 tablet    Take 1 tablet (30 mg) by mouth daily    Benign essential hypertension       metoprolol succinate 50 MG 24 hr tablet    TOPROL-XL    90 tablet    Take 1 tablet (50 mg) by mouth every morning    Benign essential hypertension, Coronary artery disease involving native coronary artery, angina presence unspecified, unspecified whether native or transplanted heart       nitroGLYcerin 0.4 MG sublingual tablet    NITROSTAT    25 tablet    One tablet under the tongue every 5 minutes if needed for chest pain. May repeat every 5 minutes for a maximum of 3 doses in 15 minutes\"    Postsurgical percutaneous transluminal coronary angioplasty status, Coronary artery disease involving native coronary artery of native heart with angina pectoris (H), Status post coronary angioplasty         "

## 2018-09-13 NOTE — PROGRESS NOTES
HPI and Plan:   See dictation    Orders Placed This Encounter   Procedures     Follow-Up with Cardiologist     Echocardiogram Complete       Orders Placed This Encounter   Medications     atorvastatin (LIPITOR) 80 MG tablet     Sig: Take 1 tablet (80 mg) by mouth daily     Dispense:  90 tablet     Refill:  3     ezetimibe (ZETIA) 10 MG tablet     Sig: Take 1 tablet (10 mg) by mouth daily     Dispense:  90 tablet     Refill:  3     lisinopril (PRINIVIL,ZESTRIL) 30 MG tablet     Sig: Take 1 tablet (30 mg) by mouth daily     Dispense:  90 tablet     Refill:  3     metoprolol succinate (TOPROL-XL) 50 MG 24 hr tablet     Sig: Take 1 tablet (50 mg) by mouth every morning     Dispense:  90 tablet     Refill:  3       Medications Discontinued During This Encounter   Medication Reason     atorvastatin (LIPITOR) 80 MG tablet Reorder     ezetimibe (ZETIA) 10 MG tablet Reorder     lisinopril (PRINIVIL,ZESTRIL) 30 MG tablet Reorder     metoprolol succinate (TOPROL-XL) 50 MG 24 hr tablet Reorder         Encounter Diagnoses   Name Primary?     Coronary artery disease involving native coronary artery of native heart without angina pectoris Yes     Benign essential hypertension      Hyperlipidemia LDL goal <70      Coronary artery disease involving native coronary artery, angina presence unspecified, unspecified whether native or transplanted heart        CURRENT MEDICATIONS:  Current Outpatient Prescriptions   Medication Sig Dispense Refill     aspirin 81 MG EC tablet Take 1 tablet (81 mg) by mouth daily Start tomorrow morning. 90 tablet 3     atorvastatin (LIPITOR) 80 MG tablet Take 1 tablet (80 mg) by mouth daily 90 tablet 3     calcium-vitamin D 500-125 MG-UNIT TABS        ezetimibe (ZETIA) 10 MG tablet Take 1 tablet (10 mg) by mouth daily 90 tablet 3     lisinopril (PRINIVIL,ZESTRIL) 30 MG tablet Take 1 tablet (30 mg) by mouth daily 90 tablet 3     metoprolol succinate (TOPROL-XL) 50 MG 24 hr tablet Take 1 tablet (50 mg) by  "mouth every morning 90 tablet 3     nitroglycerin (NITROSTAT) 0.4 MG SL tablet One tablet under the tongue every 5 minutes if needed for chest pain. May repeat every 5 minutes for a maximum of 3 doses in 15 minutes\" 25 tablet 3     [DISCONTINUED] atorvastatin (LIPITOR) 80 MG tablet Take 1 tablet (80 mg) by mouth daily 90 tablet 1     [DISCONTINUED] ezetimibe (ZETIA) 10 MG tablet Take 1 tablet (10 mg) by mouth daily 90 tablet 3     [DISCONTINUED] lisinopril (PRINIVIL,ZESTRIL) 30 MG tablet Take 1 tablet (30 mg) by mouth daily 90 tablet 1     [DISCONTINUED] metoprolol succinate (TOPROL-XL) 50 MG 24 hr tablet Take 1 tablet (50 mg) by mouth every morning 90 tablet 1       ALLERGIES     Allergies   Allergen Reactions     Bupropion Hcl      rash     Cyclobenzaprine Hives       PAST MEDICAL HISTORY:  Past Medical History:   Diagnosis Date     Abnormal stress ECG      Fibromyalgia      Hyperlipidemia      Hypertension      MI (myocardial infarction) age 51    MI in      Myocardial infarction        PAST SURGICAL HISTORY:  Past Surgical History:   Procedure Laterality Date     COLONOSCOPY  2011    Procedure:COLONOSCOPY; Screening Colonoscopy; Surgeon:ALISHA JOY; Location:[facility]     D & C  1980s    D&C     SURGICAL HISTORY OF -       Status post MI,      SURGICAL HISTORY OF -       Stents x2       FAMILY HISTORY:  Family History   Problem Relation Age of Onset     HEART DISEASE Mother      congenital,  at 82     Diabetes Mother      GASTROINTESTINAL DISEASE Mother      colon polyps non cncerous     HEART DISEASE Father      first MI in mid-50s     Cerebrovascular Disease Father       at 61     Neurologic Disorder Maternal Grandfather      brain hemmorage     HEART DISEASE Brother       of MI in early 50s     Hypertension Daughter      Cancer Sister      endometrial cancer     GASTROINTESTINAL DISEASE Sister      colon polyps non cancerous       SOCIAL HISTORY:  Social History     Social " History     Marital status:      Spouse name: N/A     Number of children: N/A     Years of education: N/A     Social History Main Topics     Smoking status: Former Smoker     Quit date: 10/13/2001     Smokeless tobacco: Never Used     Alcohol use Yes      Comment: rarely     Drug use: No     Sexual activity: Yes     Partners: Male     Other Topics Concern     Parent/Sibling W/ Cabg, Mi Or Angioplasty Before 65f 55m? Yes     father and brother     Social History Narrative        Daughter with severe hypertension    Healthy son    Works as chemical dependency technician       Review of Systems:  Skin:  Negative bruising;rash See HPI   Eyes:  Positive for glasses    ENT:  Positive for nasal congestion    Respiratory:  Negative for cough;shortness of breath     Cardiovascular:  Negative for;chest pain;palpitations;edema;syncope or near-syncope;fatigue;lightheadedness;dizziness      Gastroenterology: Negative for nausea;vomiting;heartburn    Genitourinary:  Negative      Musculoskeletal:  Positive for fibromyalgia;joint pain;joint swelling;joint stiffness    Neurologic:  Positive for numbness or tingling of hands    Psychiatric:  Negative      Heme/Lymph/Imm:  Negative      Endocrine:  Negative        Physical Exam:  Vitals: /81 (BP Location: Right arm, Cuff Size: Adult Large)  Pulse 66  Wt 87.1 kg (192 lb)  SpO2 96%  BMI 38.78 kg/m2    Constitutional:  cooperative, alert and oriented, well developed, well nourished, in no acute distress overweight      Skin:  warm and dry to the touch          Head:  normocephalic        Eyes:  sclera white        Lymph:      ENT:  no pallor or cyanosis        Neck:  no stiffness        Respiratory:  normal breath sounds, clear to auscultation, normal A-P diameter, normal symmetry, normal respiratory excursion, no use of accessory muscles         Cardiac: regular rhythm;normal S1 and S2   S4   systolic murmur;LUSB;grade 1        pulses full and equal                                         GI:  abdomen soft        Extremities and Muscular Skeletal:  no edema              Neurological:  affect appropriate        Psych:  Alert and Oriented x 3        CC  No referring provider defined for this encounter.

## 2019-03-13 ENCOUNTER — HOSPITAL ENCOUNTER (OUTPATIENT)
Dept: CARDIOLOGY | Facility: CLINIC | Age: 69
Discharge: HOME | End: 2019-03-13
Attending: NURSE PRACTITIONER | Admitting: NURSE PRACTITIONER
Payer: MEDICARE

## 2019-03-13 DIAGNOSIS — I25.10 CORONARY ARTERY DISEASE INVOLVING NATIVE CORONARY ARTERY OF NATIVE HEART WITHOUT ANGINA PECTORIS: ICD-10-CM

## 2019-03-13 PROCEDURE — 93306 TTE W/DOPPLER COMPLETE: CPT | Mod: 26 | Performed by: INTERNAL MEDICINE

## 2019-03-13 PROCEDURE — 93306 TTE W/DOPPLER COMPLETE: CPT

## 2019-03-14 NOTE — RESULT ENCOUNTER NOTE
Results noted: EF 55%; WMAs as described; no significant change from previous study. To be discussed at OV with Dr Chanel on 3/21/19

## 2019-03-21 ENCOUNTER — HOSPITAL ENCOUNTER (OUTPATIENT)
Dept: CARDIOLOGY | Facility: CLINIC | Age: 69
Discharge: HOME | End: 2019-03-21
Attending: INTERNAL MEDICINE | Admitting: INTERNAL MEDICINE
Payer: COMMERCIAL

## 2019-03-21 ENCOUNTER — OFFICE VISIT (OUTPATIENT)
Dept: CARDIOLOGY | Facility: CLINIC | Age: 69
End: 2019-03-21
Attending: NURSE PRACTITIONER
Payer: COMMERCIAL

## 2019-03-21 VITALS
BODY MASS INDEX: 38.58 KG/M2 | DIASTOLIC BLOOD PRESSURE: 85 MMHG | HEART RATE: 60 BPM | WEIGHT: 191 LBS | SYSTOLIC BLOOD PRESSURE: 162 MMHG

## 2019-03-21 DIAGNOSIS — Z98.890 STATUS POST CORONARY ANGIOGRAM: ICD-10-CM

## 2019-03-21 DIAGNOSIS — Z98.890 STATUS POST CORONARY ANGIOGRAM: Primary | ICD-10-CM

## 2019-03-21 DIAGNOSIS — I25.10 CORONARY ARTERY DISEASE INVOLVING NATIVE CORONARY ARTERY OF NATIVE HEART WITHOUT ANGINA PECTORIS: ICD-10-CM

## 2019-03-21 PROCEDURE — 93010 ELECTROCARDIOGRAM REPORT: CPT | Performed by: INTERNAL MEDICINE

## 2019-03-21 PROCEDURE — 99214 OFFICE O/P EST MOD 30 MIN: CPT | Performed by: INTERNAL MEDICINE

## 2019-03-21 PROCEDURE — 93005 ELECTROCARDIOGRAM TRACING: CPT

## 2019-03-21 NOTE — LETTER
3/21/2019    Tad TAMMY Martinez  [facility] [address]  [facility] [address]    RE: Sandra K Severson       Dear Colleague,    I had the pleasure of seeing Sandra K Severson in the [facility].    CARDIOLOGY VISIT    REASON FOR VISIT: f/u CAD    SUBJECTIVE:  68-year-old female seen for follow-up of coronary artery disease.  She also has hypertension and dyslipidemia.     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  In 2016 she had chest pain and abnormal stress test.  Angiogram showed normal left main, mild LAD, 80% mid circumflex, 40% proximal RCA, 60% mid RCA, patent distal RCA stent, single drug-eluting stent placed to the mid circumflex.  Abdominal aorta was noted to have a 30-40% stenosis just proximal to the bifurcation, left iliac had 25% ostial disease.     Lexiscan nuclear stress March 2017 showed normal perfusion, inferior diaphragm attenuation, EF 64%.  Echo March 2019 showed EF 55%, mild inferolateral hypokinesis, normal RV, no valve disease.     She has been doing well in the past 6 months.  She has not done a lot of activity over the winter.  She denies any shortness of breath or chest pain.  Blood pressure is only checked infrequently at home, she thinks it is around 130.    MEDICATIONS:  Current Outpatient Medications   Medication     aspirin 81 MG EC tablet     atorvastatin (LIPITOR) 80 MG tablet     calcium-vitamin D 500-125 MG-UNIT TABS     ezetimibe (ZETIA) 10 MG tablet     lisinopril (PRINIVIL,ZESTRIL) 30 MG tablet     metoprolol succinate (TOPROL-XL) 50 MG 24 hr tablet     nitroglycerin (NITROSTAT) 0.4 MG SL tablet     No current facility-administered medications for this visit.        ALLERGIES:  Allergies   Allergen Reactions     Bupropion Hcl      rash     Cyclobenzaprine Hives       REVIEW OF SYSTEMS:  Constitutional:  No weight loss, fever, chills, weakness or fatigue.  HEENT:  Eyes:  No visual loss, blurred vision, double  vision or yellow sclerae. No hearing loss, sneezing, congestion, runny nose or sore throat.  Skin:  No rash or itching.  Cardiovascular: per HPI  Respiratory: per HPI  GI:  No anorexia, nausea, vomiting or diarrhea. No abdominal pain or blood.  :  No dysurea, hematuria  Neurologic:  No headache, dizziness, syncope, paralysis, ataxia, numbness or tingling in the extremities. No change in bowel or bladder control.  Musculoskeletal:  No muscle, back pain, joint pain or stiffness.  Hematologic:  No anemia, bleeding or bruising.  Lymphatics:  No enlarged nodes. No history of splenectomy.  Psychiatric:  No history of depression or anxiety.  Endocrine:  No reports of sweating, cold or heat intolerance. No polyuria or polydipsia.  Allergies:  No history of asthma, hives, eczema or rhinitis.    PHYSICAL EXAM:  /85   Pulse 60   Wt 86.6 kg (191 lb)   BMI 38.58 kg/m     Constitutional: awake, alert, no distress  Eyes: PERRL, sclera nonicteric  ENT: trachea midline  Respiratory: Lungs clear  Cardiovascular: Regular rate and rhythm, no murmurs  GI: nondistended, nontender, bowel sounds present  Lymph/Hematologic: no lymphadenopathy  Skin: dry, no rash  Musculoskeletal: good muscle tone, strength 5/5 in upper and lower extremities  Neurologic: no focal deficits  Neuropsychiatric: appropriate affact    DATA:  Lab: September 2018: Cholesterol 150, HDL 59, LDL 79, triglycerides 87, creatinine 0.6     EKG: 3-21-19: sinus, rate 57, inferior Q waves    ASSESSMENT:  68-year-old female seen for follow-up of coronary artery disease.  Clinically she is doing well and has no concerning symptoms.  Blood pressure is high in clinic today.  She needs to check it more at home.  Otherwise she has no anginal type symptoms.    RECOMMENDATIONS:  1.  Coronary artery disease, status post circumflex and remote RCA stent  -Continue current medications  -Lexiscan nuclear stress in 1 year, around March 2020  -Encouraged increasing exercise and  weight loss    2.  Dyslipidemia  -Lipids at goal, continue high-dose atorvastatin and Zetia    3.  Hypertension, suspect somewhat uncontrolled  -Check blood pressure readings at home more, goal around 130/80  -If needed, start amlodipine 5 mg daily    Follow-up in 6 months with BRANDON, check lipids.    Geovanni Chanel MD  Cardiology - [facility]  Pager:  [phone]  Text Page  March 21, 2019      Thank you for allowing me to participate in the care of your patient.      Sincerely,     Geovanni Chanel MD     [facility]    cc:   Mary Jenkins, APRN CNP  [address]  [place] [address]

## 2019-03-21 NOTE — LETTER
3/21/2019    Tad TAMMY Martinez  [facility] [address]  [facility] [address]    RE: Sandra K Severson       Dear Colleague,    I had the pleasure of seeing Sandra K Severson in the [facility].    CARDIOLOGY VISIT    REASON FOR VISIT: f/u CAD    SUBJECTIVE:  68-year-old female seen for follow-up of coronary artery disease.  She also has hypertension and dyslipidemia.     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  In 2016 she had chest pain and abnormal stress test.  Angiogram showed normal left main, mild LAD, 80% mid circumflex, 40% proximal RCA, 60% mid RCA, patent distal RCA stent, single drug-eluting stent placed to the mid circumflex.  Abdominal aorta was noted to have a 30-40% stenosis just proximal to the bifurcation, left iliac had 25% ostial disease.     Lexiscan nuclear stress March 2017 showed normal perfusion, inferior diaphragm attenuation, EF 64%.  Echo March 2019 showed EF 55%, mild inferolateral hypokinesis, normal RV, no valve disease.     She has been doing well in the past 6 months.  She has not done a lot of activity over the winter.  She denies any shortness of breath or chest pain.  Blood pressure is only checked infrequently at home, she thinks it is around 130.    MEDICATIONS:  Current Outpatient Medications   Medication     aspirin 81 MG EC tablet     atorvastatin (LIPITOR) 80 MG tablet     calcium-vitamin D 500-125 MG-UNIT TABS     ezetimibe (ZETIA) 10 MG tablet     lisinopril (PRINIVIL,ZESTRIL) 30 MG tablet     metoprolol succinate (TOPROL-XL) 50 MG 24 hr tablet     nitroglycerin (NITROSTAT) 0.4 MG SL tablet     No current facility-administered medications for this visit.        ALLERGIES:  Allergies   Allergen Reactions     Bupropion Hcl      rash     Cyclobenzaprine Hives       REVIEW OF SYSTEMS:  Constitutional:  No weight loss, fever, chills, weakness or fatigue.  HEENT:  Eyes:  No visual loss, blurred vision, double  vision or yellow sclerae. No hearing loss, sneezing, congestion, runny nose or sore throat.  Skin:  No rash or itching.  Cardiovascular: per HPI  Respiratory: per HPI  GI:  No anorexia, nausea, vomiting or diarrhea. No abdominal pain or blood.  :  No dysurea, hematuria  Neurologic:  No headache, dizziness, syncope, paralysis, ataxia, numbness or tingling in the extremities. No change in bowel or bladder control.  Musculoskeletal:  No muscle, back pain, joint pain or stiffness.  Hematologic:  No anemia, bleeding or bruising.  Lymphatics:  No enlarged nodes. No history of splenectomy.  Psychiatric:  No history of depression or anxiety.  Endocrine:  No reports of sweating, cold or heat intolerance. No polyuria or polydipsia.  Allergies:  No history of asthma, hives, eczema or rhinitis.    PHYSICAL EXAM:  /85   Pulse 60   Wt 86.6 kg (191 lb)   BMI 38.58 kg/m     Constitutional: awake, alert, no distress  Eyes: PERRL, sclera nonicteric  ENT: trachea midline  Respiratory: Lungs clear  Cardiovascular: Regular rate and rhythm, no murmurs  GI: nondistended, nontender, bowel sounds present  Lymph/Hematologic: no lymphadenopathy  Skin: dry, no rash  Musculoskeletal: good muscle tone, strength 5/5 in upper and lower extremities  Neurologic: no focal deficits  Neuropsychiatric: appropriate affact    DATA:  Lab: September 2018: Cholesterol 150, HDL 59, LDL 79, triglycerides 87, creatinine 0.6     EKG: 3-21-19: sinus, rate 57, inferior Q waves    ASSESSMENT:  68-year-old female seen for follow-up of coronary artery disease.  Clinically she is doing well and has no concerning symptoms.  Blood pressure is high in clinic today.  She needs to check it more at home.  Otherwise she has no anginal type symptoms.    RECOMMENDATIONS:  1.  Coronary artery disease, status post circumflex and remote RCA stent  -Continue current medications  -Lexiscan nuclear stress in 1 year, around March 2020  -Encouraged increasing exercise and  weight loss    2.  Dyslipidemia  -Lipids at goal, continue high-dose atorvastatin and Zetia    3.  Hypertension, suspect somewhat uncontrolled  -Check blood pressure readings at home more, goal around 130/80  -If needed, start amlodipine 5 mg daily    Follow-up in 6 months with BRANDON, check lipids.    Geovanni Chanel MD  Cardiology - [facility]  Pager:  [phone]  Text Page  March 21, 2019      Thank you for allowing me to participate in the care of your patient.    Sincerely,     Geovanni Chanel MD     [facility]

## 2019-03-21 NOTE — PROGRESS NOTES
CARDIOLOGY VISIT    REASON FOR VISIT: f/u CAD    SUBJECTIVE:  68-year-old female seen for follow-up of coronary artery disease.  She also has hypertension and dyslipidemia.     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  In 2016 she had chest pain and abnormal stress test.  Angiogram showed normal left main, mild LAD, 80% mid circumflex, 40% proximal RCA, 60% mid RCA, patent distal RCA stent, single drug-eluting stent placed to the mid circumflex.  Abdominal aorta was noted to have a 30-40% stenosis just proximal to the bifurcation, left iliac had 25% ostial disease.     Lexiscan nuclear stress March 2017 showed normal perfusion, inferior diaphragm attenuation, EF 64%.  Echo March 2019 showed EF 55%, mild inferolateral hypokinesis, normal RV, no valve disease.     She has been doing well in the past 6 months.  She has not done a lot of activity over the winter.  She denies any shortness of breath or chest pain.  Blood pressure is only checked infrequently at home, she thinks it is around 130.    MEDICATIONS:  Current Outpatient Medications   Medication     aspirin 81 MG EC tablet     atorvastatin (LIPITOR) 80 MG tablet     calcium-vitamin D 500-125 MG-UNIT TABS     ezetimibe (ZETIA) 10 MG tablet     lisinopril (PRINIVIL,ZESTRIL) 30 MG tablet     metoprolol succinate (TOPROL-XL) 50 MG 24 hr tablet     nitroglycerin (NITROSTAT) 0.4 MG SL tablet     No current facility-administered medications for this visit.        ALLERGIES:  Allergies   Allergen Reactions     Bupropion Hcl      rash     Cyclobenzaprine Hives       REVIEW OF SYSTEMS:  Constitutional:  No weight loss, fever, chills, weakness or fatigue.  HEENT:  Eyes:  No visual loss, blurred vision, double vision or yellow sclerae. No hearing loss, sneezing, congestion, runny nose or sore throat.  Skin:  No rash or itching.  Cardiovascular: per HPI  Respiratory: per HPI  GI:  No anorexia, nausea, vomiting or diarrhea. No abdominal pain or  blood.  :  No dysurea, hematuria  Neurologic:  No headache, dizziness, syncope, paralysis, ataxia, numbness or tingling in the extremities. No change in bowel or bladder control.  Musculoskeletal:  No muscle, back pain, joint pain or stiffness.  Hematologic:  No anemia, bleeding or bruising.  Lymphatics:  No enlarged nodes. No history of splenectomy.  Psychiatric:  No history of depression or anxiety.  Endocrine:  No reports of sweating, cold or heat intolerance. No polyuria or polydipsia.  Allergies:  No history of asthma, hives, eczema or rhinitis.    PHYSICAL EXAM:  /85   Pulse 60   Wt 86.6 kg (191 lb)   BMI 38.58 kg/m    Constitutional: awake, alert, no distress  Eyes: PERRL, sclera nonicteric  ENT: trachea midline  Respiratory: Lungs clear  Cardiovascular: Regular rate and rhythm, no murmurs  GI: nondistended, nontender, bowel sounds present  Lymph/Hematologic: no lymphadenopathy  Skin: dry, no rash  Musculoskeletal: good muscle tone, strength 5/5 in upper and lower extremities  Neurologic: no focal deficits  Neuropsychiatric: appropriate affact    DATA:  Lab: September 2018: Cholesterol 150, HDL 59, LDL 79, triglycerides 87, creatinine 0.6     EKG: 3-21-19: sinus, rate 57, inferior Q waves    ASSESSMENT:  68-year-old female seen for follow-up of coronary artery disease.  Clinically she is doing well and has no concerning symptoms.  Blood pressure is high in clinic today.  She needs to check it more at home.  Otherwise she has no anginal type symptoms.    RECOMMENDATIONS:  1.  Coronary artery disease, status post circumflex and remote RCA stent  -Continue current medications  -Lexiscan nuclear stress in 1 year, around March 2020  -Encouraged increasing exercise and weight loss    2.  Dyslipidemia  -Lipids at goal, continue high-dose atorvastatin and Zetia    3.  Hypertension, suspect somewhat uncontrolled  -Check blood pressure readings at home more, goal around 130/80  -If needed, start amlodipine 5  mg daily    Follow-up in 6 months with BRANDON, check lipids.    Geovanni Chanel MD  Cardiology - [facility]  Pager:  [phone]  Text Page  March 21, 2019

## 2019-09-24 DIAGNOSIS — E78.5 HYPERLIPIDEMIA LDL GOAL <70: ICD-10-CM

## 2019-09-24 RX ORDER — ATORVASTATIN CALCIUM 80 MG/1
80 TABLET, FILM COATED ORAL DAILY
Qty: 90 TABLET | Refills: 0 | Status: SHIPPED | OUTPATIENT
Start: 2019-09-24 | End: 2019-12-09

## 2019-09-26 NOTE — PROGRESS NOTES
Cardiology Clinic Progress Note  Sandra K Severson MRN# [id number]   YOB: 1950 Age: 67 year old     Reason For Visit:  6-month follow-up    Primary Cardiologist:   Dr. Tovar          History of Presenting Illness:    Sandra K Severson is a pleasant 67 year old patient with a past cardiac history significant for CAD, hypertension, and hyperlipidemia.    Status post inferior MI 2001 with thrombectomy and stent to the  RCA. In 2016, she had chest pain and abnormal stress test. Angiogram with stent to the mid circumflex. She was also noted to have 30-40% stenosis in the abdominal aorta just proximal to bifurcation and 25% ostial disease in the left iliac. Lexiscan 2017 with normal perfusion.  Echocardiogram March 2019 with normal EF, mild inferolateral hypokinesis, normal RV, no significant valvular disease.    Patient was last seen by Dr. Chanel in March 2018.  She denied any cardiac complaints but had not done much activity over the winter. Blood pressure was elevated and he recommended monitoring this more at home.   If needed, could start amlodipine.  Recommended repeating Lexiscan in March 2020 and encouraged exercise and weight loss.    Pt presents today for six-month follow-up.  Lipid profile today showing well-controlled lipids with total cholesterol 147 HDL 66 LDL 66 and triglycerides 74. These results were reviewed with her today. Blood pressure today remains mildly elevated.  Blood pressures at home have been 138 140 systolic.  She is agreeable to increasing lisinopril.  His blood pressures remain elevated, at home,  She is agreeable to adding amlodipine.  She denies any anginal symptoms and has been doing well from a cardiac standpoint. Patient reports no chest pain, shortness of breath, PND, orthopnea, presyncope, syncope, edema, heart racing, or palpitations.      Current Cardiac Medications    metoprolol XL 50 mg daily  Lisinopril 30 mg daily  Atorvastatin 80 mg daily  Nitroglycerin  p.r.n.  Aspirin 81 mg daily   Zetia 10 mg daily                     Assessment and Plan:     Plan  1.  Increase lisinopril to 40 mg daily for HTN  2. BMP in 1 week   3.  Call if blood pressures remain greater than 130/80 and we will add amlodipine 2.5 mg daily  4.  Follow-up with Dr. Chanel in six months with stress test prior      1. CAD    status post inferior MI 2001 with stent to the RCA    stent to the midcircumflex in 2016    no angina    Continue statin, aspirin, ACE inhibitor, beta blocker      2. Hypertension    not well controlled     continue metoprolol, lisinopril    Consider starting amlodipine if needed       3. Hyperlipidemia    LDL 66 9/2019    continue atorvastatin 80 mg daily and zetia          Thank you for allowing me to participate in this delightful patient's care.      This note was completed in part using Dragon voice recognition software. Although reviewed after completion, some word and grammatical errors may occur.    Mary Jenkins, APRN, CNP           Data:   All laboratory data reviewed        HPI and Plan:   See dictation    Orders Placed This Encounter   Procedures     NM Lexiscan stress test     Basic metabolic panel     Follow-Up with Cardiologist       Orders Placed This Encounter   Medications     Calcium Lactate 100 MG TABS     Multiple Vitamins-Minerals (MULTIVITAMIN ADULTS PO)     lisinopril (PRINIVIL/ZESTRIL) 10 MG tablet     Sig: Take 1 tablet (10 mg) by mouth daily Add to 30 mg tablet to get 40 mg total.     Dispense:  90 tablet     Refill:  3       Medications Discontinued During This Encounter   Medication Reason     calcium-vitamin D 500-125 MG-UNIT TABS      lisinopril (PRINIVIL,ZESTRIL) 30 MG tablet          Encounter Diagnoses   Name Primary?     Coronary artery disease involving native coronary artery of native heart without angina pectoris      Benign essential hypertension Yes     Hyperlipidemia LDL goal <70        CURRENT MEDICATIONS:  Current Outpatient  "Medications   Medication Sig Dispense Refill     aspirin 81 MG EC tablet Take 1 tablet (81 mg) by mouth daily Start tomorrow morning. 90 tablet 3     atorvastatin (LIPITOR) 80 MG tablet Take 1 tablet (80 mg) by mouth daily 90 tablet 0     Calcium Lactate 100 MG TABS        ezetimibe (ZETIA) 10 MG tablet Take 1 tablet (10 mg) by mouth daily 90 tablet 3     lisinopril (PRINIVIL/ZESTRIL) 10 MG tablet Take 1 tablet (10 mg) by mouth daily Add to 30 mg tablet to get 40 mg total. 90 tablet 3     metoprolol succinate (TOPROL-XL) 50 MG 24 hr tablet Take 1 tablet (50 mg) by mouth every morning 90 tablet 3     Multiple Vitamins-Minerals (MULTIVITAMIN ADULTS PO)        nitroglycerin (NITROSTAT) 0.4 MG SL tablet One tablet under the tongue every 5 minutes if needed for chest pain. May repeat every 5 minutes for a maximum of 3 doses in 15 minutes\" 25 tablet 3       ALLERGIES     Allergies   Allergen Reactions     Bupropion Hcl      rash     Cyclobenzaprine Hives       PAST MEDICAL HISTORY:  Past Medical History:   Diagnosis Date     Abnormal stress ECG      Fibromyalgia      Hyperlipidemia      Hypertension      MI (myocardial infarction) (H) age 51    MI in      Myocardial infarction (H)        PAST SURGICAL HISTORY:  Past Surgical History:   Procedure Laterality Date     COLONOSCOPY  2011    Procedure:COLONOSCOPY; Screening Colonoscopy; Surgeon:ALISHA JOY; Location:[facility] [facility]     D & C  [facility]    D&C     SURGICAL HISTORY OF -       Status post MI,      SURGICAL HISTORY OF -       Stents x2       FAMILY HISTORY:  Family History   Problem Relation Age of Onset     Heart Disease Mother         congenital,  at 82     Diabetes Mother      Gastrointestinal Disease Mother         colon polyps non cncerous     Heart Disease Father         first MI in mid-50s     Cerebrovascular Disease Father          at 61     Neurologic Disorder Maternal Grandfather         brain hemmorage     Heart Disease Brother         "  of MI in early 50s     Hypertension Daughter      Cancer Sister         endometrial cancer     Gastrointestinal Disease Sister         colon polyps non cancerous       SOCIAL HISTORY:  Social History     Socioeconomic History     Marital status:      Spouse name: None     Number of children: None     Years of education: None     Highest education level: None   Occupational History     None   Social Needs     Financial resource strain: None     Food insecurity:     Worry: None     Inability: None     Transportation needs:     Medical: None     Non-medical: None   Tobacco Use     Smoking status: Former Smoker     Last attempt to quit: 10/13/2001     Years since quittin.9     Smokeless tobacco: Never Used   Substance and Sexual Activity     Alcohol use: Yes     Comment: rarely     Drug use: No     Sexual activity: Yes     Partners: Male   Lifestyle     Physical activity:     Days per week: None     Minutes per session: None     Stress: None   Relationships     Social connections:     Talks on phone: None     Gets together: None     Attends Worship service: None     Active member of club or organization: None     Attends meetings of clubs or organizations: None     Relationship status: None     Intimate partner violence:     Fear of current or ex partner: None     Emotionally abused: None     Physically abused: None     Forced sexual activity: None   Other Topics Concern     Parent/sibling w/ CABG, MI or angioplasty before 65F 55M? Yes     Comment: father and brother   Social History Narrative        Daughter with severe hypertension    Healthy son    Works as chemical dependency technician       Review of Systems:  Skin:  Negative bruising;rash See HPI   Eyes:  Positive for glasses    ENT:  not assessed      Respiratory:  Positive for cough;shortness of breath     Cardiovascular:  Negative for;palpitations;chest pain;syncope or near-syncope;fatigue;lightheadedness;dizziness Positive for;edema     Gastroenterology: Negative for nausea;vomiting;heartburn    Genitourinary:  Negative      Musculoskeletal:  Positive for fibromyalgia;joint pain;joint swelling;joint stiffness    Neurologic:  Negative numbness or tingling of hands    Psychiatric:  Negative      Heme/Lymph/Imm:  Negative      Endocrine:  Negative        Physical Exam:  Vitals: BP (!) 145/85   Pulse 65   Wt 88 kg (194 lb)   SpO2 95%   BMI 39.18 kg/m      Constitutional:  cooperative, alert and oriented, well developed, well nourished, in no acute distress overweight      Skin:  warm and dry to the touch          Head:  normocephalic        Eyes:  sclera white        Lymph:      ENT:  no pallor or cyanosis        Neck:  no stiffness        Respiratory:  clear to auscultation;normal symmetry         Cardiac: regular rhythm;normal S1 and S2   S4   systolic murmur;LUSB;grade 1        pulses full and equal                                        GI:  abdomen soft        Extremities and Muscular Skeletal:  no edema              Neurological:  affect appropriate        Psych:  Alert and Oriented x 3        CC  Geovanni Chanel MD  [facility]  [address] [id number]

## 2019-09-27 ENCOUNTER — OFFICE VISIT (OUTPATIENT)
Dept: CARDIOLOGY | Facility: CLINIC | Age: 69
End: 2019-09-27
Attending: INTERNAL MEDICINE
Payer: COMMERCIAL

## 2019-09-27 ENCOUNTER — IMMUNIZATION (OUTPATIENT)
Dept: FAMILY MEDICINE | Facility: CLINIC | Age: 69
End: 2019-09-27
Payer: COMMERCIAL

## 2019-09-27 VITALS
OXYGEN SATURATION: 95 % | BODY MASS INDEX: 39.18 KG/M2 | SYSTOLIC BLOOD PRESSURE: 145 MMHG | WEIGHT: 194 LBS | HEART RATE: 65 BPM | DIASTOLIC BLOOD PRESSURE: 85 MMHG

## 2019-09-27 DIAGNOSIS — I25.10 CORONARY ARTERY DISEASE INVOLVING NATIVE CORONARY ARTERY OF NATIVE HEART WITHOUT ANGINA PECTORIS: ICD-10-CM

## 2019-09-27 DIAGNOSIS — E78.5 HYPERLIPIDEMIA LDL GOAL <70: ICD-10-CM

## 2019-09-27 DIAGNOSIS — Z23 NEED FOR PROPHYLACTIC VACCINATION AND INOCULATION AGAINST INFLUENZA: Primary | ICD-10-CM

## 2019-09-27 DIAGNOSIS — I10 BENIGN ESSENTIAL HYPERTENSION: Primary | ICD-10-CM

## 2019-09-27 LAB
CHOLEST SERPL-MCNC: 147 MG/DL
HDLC SERPL-MCNC: 66 MG/DL
LDLC SERPL CALC-MCNC: 66 MG/DL
NONHDLC SERPL-MCNC: 81 MG/DL
TRIGL SERPL-MCNC: 74 MG/DL

## 2019-09-27 PROCEDURE — 36415 COLL VENOUS BLD VENIPUNCTURE: CPT | Performed by: INTERNAL MEDICINE

## 2019-09-27 PROCEDURE — 80061 LIPID PANEL: CPT | Performed by: INTERNAL MEDICINE

## 2019-09-27 PROCEDURE — 99214 OFFICE O/P EST MOD 30 MIN: CPT | Performed by: NURSE PRACTITIONER

## 2019-09-27 PROCEDURE — 90662 IIV NO PRSV INCREASED AG IM: CPT

## 2019-09-27 PROCEDURE — G0008 ADMIN INFLUENZA VIRUS VAC: HCPCS

## 2019-09-27 RX ORDER — CALCIUM LACTATE 100 MG
TABLET ORAL
COMMUNITY

## 2019-09-27 RX ORDER — LISINOPRIL 10 MG/1
10 TABLET ORAL DAILY
Qty: 90 TABLET | Refills: 3 | Status: SHIPPED | OUTPATIENT
Start: 2019-09-27 | End: 2019-12-16

## 2019-09-27 NOTE — LETTER
9/27/2019    Tad Martinez  [facility] [address]  [place] [address]    RE: Sandra K Severson       Dear Colleague,    I had the pleasure of seeing Sandra K Severson in the [facility].    Cardiology Clinic Progress Note  Sandra K Severson MRN# [id number]   YOB: 1950 Age: 67 year old     Reason For Visit:  6-month follow-up    Primary Cardiologist:   Dr. Tovar          History of Presenting Illness:    Sandra K Severson is a pleasant 67 year old patient with a past cardiac history significant for CAD, hypertension, and hyperlipidemia.    Status post inferior MI 2001 with thrombectomy and stent to the  RCA. In 2016, she had chest pain and abnormal stress test. Angiogram with stent to the mid circumflex. She was also noted to have 30-40% stenosis in the abdominal aorta just proximal to bifurcation and 25% ostial disease in the left iliac. Lexiscan 2017 with normal perfusion.  Echocardiogram March 2019 with normal EF, mild inferolateral hypokinesis, normal RV, no significant valvular disease.    Patient was last seen by Dr. Chanel in March 2018.  She denied any cardiac complaints but had not done much activity over the winter. Blood pressure was elevated and he recommended monitoring this more at home.   If needed, could start amlodipine.  Recommended repeating Lexiscan in March 2020 and encouraged exercise and weight loss.    Pt presents today for six-month follow-up.  Lipid profile today showing well-controlled lipids with total cholesterol 147 HDL 66 LDL 66 and triglycerides 74. These results were reviewed with her today. Blood pressure today remains mildly elevated.  Blood pressures at home have been 138 140 systolic.  She is agreeable to increasing lisinopril.  His blood pressures remain elevated, at home,  She is agreeable to adding amlodipine.  She denies any anginal symptoms and has been doing well from a cardiac standpoint. Patient reports no  chest pain, shortness of breath, PND, orthopnea, presyncope, syncope, edema, heart racing, or palpitations.      Current Cardiac Medications    metoprolol XL 50 mg daily  Lisinopril 30 mg daily  Atorvastatin 80 mg daily  Nitroglycerin p.r.n.  Aspirin 81 mg daily   Zetia 10 mg daily                     Assessment and Plan:     Plan  1.  Increase lisinopril to 40 mg daily for HTN  2. BMP in 1 week   3.  Call if blood pressures remain greater than 130/80 and we will add amlodipine 2.5 mg daily  4.  Follow-up with Dr. Chanel in six months with stress test prior      1. CAD    status post inferior MI 2001 with stent to the RCA    stent to the midcircumflex in 2016    no angina    Continue statin, aspirin, ACE inhibitor, beta blocker      2. Hypertension    not well controlled     continue metoprolol, lisinopril    Consider starting amlodipine if needed       3. Hyperlipidemia    LDL 66 9/2019    continue atorvastatin 80 mg daily and zetia          Thank you for allowing me to participate in this delightful patient's care.      This note was completed in part using Dragon voice recognition software. Although reviewed after completion, some word and grammatical errors may occur.    Mary Jenkins, TIMOTHY, CNP           Data:   All laboratory data reviewed        HPI and Plan:   See dictation    Orders Placed This Encounter   Procedures     NM Lexiscan stress test     Basic metabolic panel     Follow-Up with Cardiologist       Orders Placed This Encounter   Medications     Calcium Lactate 100 MG TABS     Multiple Vitamins-Minerals (MULTIVITAMIN ADULTS PO)     lisinopril (PRINIVIL/ZESTRIL) 10 MG tablet     Sig: Take 1 tablet (10 mg) by mouth daily Add to 30 mg tablet to get 40 mg total.     Dispense:  90 tablet     Refill:  3       Medications Discontinued During This Encounter   Medication Reason     calcium-vitamin D 500-125 MG-UNIT TABS      lisinopril (PRINIVIL,ZESTRIL) 30 MG tablet          Encounter Diagnoses  "  Name Primary?     Coronary artery disease involving native coronary artery of native heart without angina pectoris      Benign essential hypertension Yes     Hyperlipidemia LDL goal <70        CURRENT MEDICATIONS:  Current Outpatient Medications   Medication Sig Dispense Refill     aspirin 81 MG EC tablet Take 1 tablet (81 mg) by mouth daily Start tomorrow morning. 90 tablet 3     atorvastatin (LIPITOR) 80 MG tablet Take 1 tablet (80 mg) by mouth daily 90 tablet 0     Calcium Lactate 100 MG TABS        ezetimibe (ZETIA) 10 MG tablet Take 1 tablet (10 mg) by mouth daily 90 tablet 3     lisinopril (PRINIVIL/ZESTRIL) 10 MG tablet Take 1 tablet (10 mg) by mouth daily Add to 30 mg tablet to get 40 mg total. 90 tablet 3     metoprolol succinate (TOPROL-XL) 50 MG 24 hr tablet Take 1 tablet (50 mg) by mouth every morning 90 tablet 3     Multiple Vitamins-Minerals (MULTIVITAMIN ADULTS PO)        nitroglycerin (NITROSTAT) 0.4 MG SL tablet One tablet under the tongue every 5 minutes if needed for chest pain. May repeat every 5 minutes for a maximum of 3 doses in 15 minutes\" 25 tablet 3       ALLERGIES     Allergies   Allergen Reactions     Bupropion Hcl      rash     Cyclobenzaprine Hives       PAST MEDICAL HISTORY:  Past Medical History:   Diagnosis Date     Abnormal stress ECG      Fibromyalgia      Hyperlipidemia      Hypertension      MI (myocardial infarction) (H) age 51    MI in      Myocardial infarction (H)        PAST SURGICAL HISTORY:  Past Surgical History:   Procedure Laterality Date     COLONOSCOPY  2011    Procedure:COLONOSCOPY; Screening Colonoscopy; Surgeon:ALISHA JOY; Location:[facility]     D & C  [facility]    D&C     SURGICAL HISTORY OF -       Status post MI,      SURGICAL HISTORY OF -       Stents x2       FAMILY HISTORY:  Family History   Problem Relation Age of Onset     Heart Disease Mother         congenital,  at 82     Diabetes Mother      Gastrointestinal Disease Mother         " colon polyps non cncerous     Heart Disease Father         first MI in mid-50s     Cerebrovascular Disease Father          at 61     Neurologic Disorder Maternal Grandfather         brain hemmorage     Heart Disease Brother          of MI in early 50s     Hypertension Daughter      Cancer Sister         endometrial cancer     Gastrointestinal Disease Sister         colon polyps non cancerous       SOCIAL HISTORY:  Social History     Socioeconomic History     Marital status:      Spouse name: None     Number of children: None     Years of education: None     Highest education level: None   Occupational History     None   Social Needs     Financial resource strain: None     Food insecurity:     Worry: None     Inability: None     Transportation needs:     Medical: None     Non-medical: None   Tobacco Use     Smoking status: Former Smoker     Last attempt to quit: 10/13/2001     Years since quittin.9     Smokeless tobacco: Never Used   Substance and Sexual Activity     Alcohol use: Yes     Comment: rarely     Drug use: No     Sexual activity: Yes     Partners: Male   Lifestyle     Physical activity:     Days per week: None     Minutes per session: None     Stress: None   Relationships     Social connections:     Talks on phone: None     Gets together: None     Attends [employer] service: None     Active member of club or organization: None     Attends meetings of clubs or organizations: None     Relationship status: None     Intimate partner violence:     Fear of current or ex partner: None     Emotionally abused: None     Physically abused: None     Forced sexual activity: None   Other Topics Concern     Parent/sibling w/ CABG, MI or angioplasty before 65F 55M? Yes     Comment: father and brother   Social History Narrative        Daughter with severe hypertension    Healthy son    Works as chemical dependency technician       Review of Systems:  Skin:  Negative bruising;rash See HPI   Eyes:   Positive for glasses    ENT:  not assessed      Respiratory:  Positive for cough;shortness of breath     Cardiovascular:  Negative for;palpitations;chest pain;syncope or near-syncope;fatigue;lightheadedness;dizziness Positive for;edema    Gastroenterology: Negative for nausea;vomiting;heartburn    Genitourinary:  Negative      Musculoskeletal:  Positive for fibromyalgia;joint pain;joint swelling;joint stiffness    Neurologic:  Negative numbness or tingling of hands    Psychiatric:  Negative      Heme/Lymph/Imm:  Negative      Endocrine:  Negative        Physical Exam:  Vitals: BP (!) 145/85   Pulse 65   Wt 88 kg (194 lb)   SpO2 95%   BMI 39.18 kg/m       Constitutional:  cooperative, alert and oriented, well developed, well nourished, in no acute distress overweight      Skin:  warm and dry to the touch          Head:  normocephalic        Eyes:  sclera white        Lymph:      ENT:  no pallor or cyanosis        Neck:  no stiffness        Respiratory:  clear to auscultation;normal symmetry         Cardiac: regular rhythm;normal S1 and S2   S4   systolic murmur;LUSB;grade 1        pulses full and equal                                        GI:  abdomen soft        Extremities and Muscular Skeletal:  no edema              Neurological:  affect appropriate        Psych:  Alert and Oriented x 3            Thank you for allowing me to participate in the care of your patient.    Sincerely,     TIMOTHY [facility]

## 2019-09-27 NOTE — PROGRESS NOTES
Patient presents to influenza program requesting influenza vaccination.  Standing orders implemented.    Vaccination given by Carley Resendez MA    Recorded by Carley Resendez MA

## 2019-09-27 NOTE — PATIENT INSTRUCTIONS
"[facility] HEART CARE  [facility]~[address]~[place], [place]~[address]  Thank you for your  Heart Care visit today. If you have questions regarding your visit, please contact your cardiology RN's, Rocio Mckenzie, at [phone]. Your provider has recommended the following:  Medication Changes:  INCREASE lisinopril to 40 mg daily   Recommendations:  1. Check blood pressure at least 1 hour after medications. Call the clinic if your blood pressure is consistently greater than 130/80.   2. Call when you need the refill of lisinopril and we will send in 40 mg tablets   Follow-up:  1. nonfasting lab work in 1-2 weeks  2. See Dr. Chanel for cardiology follow up at [facility]: April 2020 with stress test prior.  Call in Jan, to schedule the appointment.  To schedule a future appointment, we kindly ask that you call cardiology scheduling at [phone] three months prior to requested revisit date.      [facility] cardiology clinic is staffed with \"Advance Practice Providers\". These are our cardiology Physician Assistants and Nurse Practitioners.   Please call cardiology scheduling if you feel you need clinical evaluation with them at any time for any cardiac reason.   Reminder:  For your safety, we ask that you bring in your current medication(s) or an updated list of your medications with you to EACH office visit. Include the medication name, dose of pill on bottle and how you are taking it. Include over-the-counter medications or supplements. Your provider will review this at each visit and plan your care based on your current information.   ~~~~~~~~~~~~~~~~~~~~~~~~~~~~~~~~~~~~~~~  \"[facility]\" campus telephone numbers for reference:  Cardiology Scheduling~[phone]  Diagnostic Imaging Scheduling~[phone]  Lab Scheduling~[phone]  Anticoagulation Clinic~[phone]  Cardiac Rehabilitation~[phone]  CORE Clinic RN's~[phone] (at " [place])  Cardiology Clinic RN's~[phone] (Rocio Mckenzie, RN)  ~~~~~~~~~~~~~~~~~~~~~~~~~~~~~~~~~~~~~~~~

## 2019-11-18 DIAGNOSIS — I10 BENIGN ESSENTIAL HYPERTENSION: ICD-10-CM

## 2019-11-18 DIAGNOSIS — I25.10 CORONARY ARTERY DISEASE INVOLVING NATIVE CORONARY ARTERY, ANGINA PRESENCE UNSPECIFIED, UNSPECIFIED WHETHER NATIVE OR TRANSPLANTED HEART: ICD-10-CM

## 2019-11-18 RX ORDER — METOPROLOL SUCCINATE 50 MG/1
50 TABLET, EXTENDED RELEASE ORAL EVERY MORNING
Qty: 90 TABLET | Refills: 3 | Status: SHIPPED | OUTPATIENT
Start: 2019-11-18 | End: 2019-11-19

## 2019-11-19 RX ORDER — METOPROLOL SUCCINATE 50 MG/1
50 TABLET, EXTENDED RELEASE ORAL EVERY MORNING
Qty: 90 TABLET | Refills: 3 | Status: SHIPPED | OUTPATIENT
Start: 2019-11-19 | End: 2020-02-03

## 2019-12-05 DIAGNOSIS — E78.5 HYPERLIPIDEMIA LDL GOAL <70: ICD-10-CM

## 2019-12-05 RX ORDER — EZETIMIBE 10 MG/1
10 TABLET ORAL DAILY
Qty: 90 TABLET | Refills: 3 | Status: SHIPPED | OUTPATIENT
Start: 2019-12-05 | End: 2020-11-19

## 2019-12-09 DIAGNOSIS — E78.5 HYPERLIPIDEMIA LDL GOAL <70: ICD-10-CM

## 2019-12-09 RX ORDER — ATORVASTATIN CALCIUM 80 MG/1
80 TABLET, FILM COATED ORAL DAILY
Qty: 90 TABLET | Refills: 3 | Status: SHIPPED | OUTPATIENT
Start: 2019-12-09 | End: 2020-03-04

## 2019-12-16 DIAGNOSIS — I10 BENIGN ESSENTIAL HYPERTENSION: ICD-10-CM

## 2019-12-16 RX ORDER — LISINOPRIL 40 MG/1
40 TABLET ORAL DAILY
Qty: 90 TABLET | Refills: 3 | Status: SHIPPED | OUTPATIENT
Start: 2019-12-16 | End: 2020-03-04

## 2019-12-16 RX ORDER — LISINOPRIL 40 MG/1
40 TABLET ORAL DAILY
Qty: 90 TABLET | Refills: 3 | Status: SHIPPED | OUTPATIENT
Start: 2019-12-16 | End: 2019-12-16

## 2020-02-03 DIAGNOSIS — I10 BENIGN ESSENTIAL HYPERTENSION: ICD-10-CM

## 2020-02-03 DIAGNOSIS — I25.10 CORONARY ARTERY DISEASE INVOLVING NATIVE CORONARY ARTERY, ANGINA PRESENCE UNSPECIFIED, UNSPECIFIED WHETHER NATIVE OR TRANSPLANTED HEART: ICD-10-CM

## 2020-02-03 RX ORDER — METOPROLOL SUCCINATE 50 MG/1
50 TABLET, EXTENDED RELEASE ORAL EVERY MORNING
Qty: 90 TABLET | Refills: 3 | Status: SHIPPED | OUTPATIENT
Start: 2020-02-03 | End: 2021-02-10

## 2020-03-02 ENCOUNTER — HOSPITAL ENCOUNTER (OUTPATIENT)
Dept: NUCLEAR MEDICINE | Facility: CLINIC | Age: 70
Setting detail: NUCLEAR MEDICINE
Discharge: HOME | End: 2020-03-02
Attending: NURSE PRACTITIONER | Admitting: NURSE PRACTITIONER
Payer: MEDICARE

## 2020-03-02 VITALS — BODY MASS INDEX: 39.11 KG/M2 | WEIGHT: 194 LBS | HEIGHT: 59 IN

## 2020-03-02 DIAGNOSIS — I25.10 CORONARY ARTERY DISEASE INVOLVING NATIVE CORONARY ARTERY OF NATIVE HEART WITHOUT ANGINA PECTORIS: ICD-10-CM

## 2020-03-02 LAB
CV STRESS MAX HR HE: 110
NUC STRESS EJECTION FRACTION: 58 %
RATE PRESSURE PRODUCT: NORMAL
STRESS ECHO BASELINE DIASTOLIC HE: 92
STRESS ECHO BASELINE HR: 64
STRESS ECHO BASELINE SYSTOLIC BP: 164
STRESS ECHO CALCULATED PERCENT HR: 72 %
STRESS ECHO LAST STRESS DIASTOLIC BP: 68
STRESS ECHO LAST STRESS SYSTOLIC BP: 170
STRESS ECHO TARGET HR: 153

## 2020-03-02 PROCEDURE — 93018 CV STRESS TEST I&R ONLY: CPT | Performed by: INTERNAL MEDICINE

## 2020-03-02 PROCEDURE — 78452 HT MUSCLE IMAGE SPECT MULT: CPT

## 2020-03-02 PROCEDURE — A9502 TC99M TETROFOSMIN: HCPCS | Performed by: NURSE PRACTITIONER

## 2020-03-02 PROCEDURE — 34300033 ZZH RX 343: Performed by: NURSE PRACTITIONER

## 2020-03-02 PROCEDURE — 93016 CV STRESS TEST SUPVJ ONLY: CPT

## 2020-03-02 PROCEDURE — 25000128 H RX IP 250 OP 636: Performed by: NURSE PRACTITIONER

## 2020-03-02 PROCEDURE — 78452 HT MUSCLE IMAGE SPECT MULT: CPT | Mod: 26 | Performed by: INTERNAL MEDICINE

## 2020-03-02 PROCEDURE — 93017 CV STRESS TEST TRACING ONLY: CPT

## 2020-03-02 RX ORDER — REGADENOSON 0.08 MG/ML
0.4 INJECTION, SOLUTION INTRAVENOUS ONCE
Status: COMPLETED | OUTPATIENT
Start: 2020-03-02 | End: 2020-03-02

## 2020-03-02 RX ADMIN — TETROFOSMIN 10.8 MCI.: 1.38 INJECTION, POWDER, LYOPHILIZED, FOR SOLUTION INTRAVENOUS at 09:58

## 2020-03-02 RX ADMIN — REGADENOSON 0.4 MG: 0.08 INJECTION, SOLUTION INTRAVENOUS at 10:54

## 2020-03-02 RX ADMIN — TETROFOSMIN 32.3 MCI.: 1.38 INJECTION, POWDER, LYOPHILIZED, FOR SOLUTION INTRAVENOUS at 11:01

## 2020-03-02 ASSESSMENT — MIFFLIN-ST. JEOR: SCORE: 1320.61

## 2020-03-04 DIAGNOSIS — I10 BENIGN ESSENTIAL HYPERTENSION: ICD-10-CM

## 2020-03-04 DIAGNOSIS — E78.5 HYPERLIPIDEMIA LDL GOAL <70: ICD-10-CM

## 2020-03-04 RX ORDER — ATORVASTATIN CALCIUM 80 MG/1
80 TABLET, FILM COATED ORAL DAILY
Qty: 90 TABLET | Refills: 3 | Status: SHIPPED | OUTPATIENT
Start: 2020-03-04

## 2020-03-04 RX ORDER — LISINOPRIL 40 MG/1
40 TABLET ORAL DAILY
Qty: 90 TABLET | Refills: 3 | Status: SHIPPED | OUTPATIENT
Start: 2020-03-04

## 2020-03-05 NOTE — PROGRESS NOTES
CARDIOLOGY VISIT    REASON FOR VISIT: Coronary artery disease    SUBJECTIVE:  69-year-old female seen for follow-up of coronary artery disease.  She also has hypertension and dyslipidemia.      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  In 2016 she had chest pain and abnormal stress test.  Angiogram showed normal left main, mild LAD, 80% mid circumflex, 40% proximal RCA, 60% mid RCA, patent distal RCA stent, single drug-eluting stent placed to the mid circumflex.  Abdominal aorta was noted to have a 30-40% stenosis just proximal to the bifurcation, left iliac had 25% ostial disease.      Lexiscan nuclear stress March 2017 showed normal perfusion, inferior diaphragm attenuation, EF 64%.  Echo March 2019 showed EF 55%, mild inferolateral hypokinesis, normal RV, no valve disease.     Lexiscan nuclear stress March 2020 showed small nontransmural infarct of the basal inferior segment with medium lou-infarct ischemia, EF 58%.     Lately she has no new significant symptoms, but does feel slightly more dyspneic with exertion than 1 year ago.  She denies any overt chest pain with exertion.  She can walk on the treadmill at 3.0 mph, 0% incline with really no symptoms.  Blood pressure has been running 140/70, this is about 10-15 points higher than it was 6 months ago.    MEDICATIONS:  Current Outpatient Medications   Medication     aspirin 81 MG EC tablet     atorvastatin (LIPITOR) 80 MG tablet     Calcium Lactate 100 MG TABS     ezetimibe (ZETIA) 10 MG tablet     lisinopril (ZESTRIL) 40 MG tablet     metoprolol succinate ER (TOPROL-XL) 50 MG 24 hr tablet     Multiple Vitamins-Minerals (MULTIVITAMIN ADULTS PO)     nitroglycerin (NITROSTAT) 0.4 MG SL tablet     No current facility-administered medications for this visit.        ALLERGIES:  Allergies   Allergen Reactions     Bupropion Hcl      rash     Cyclobenzaprine Hives     Sulfa Drugs Rash     Stated years ago had rash from sulfa drug        REVIEW OF SYSTEMS:  Constitutional:  No weight loss, fever, chills, weakness or fatigue.  HEENT:  Eyes:  No visual loss, blurred vision, double vision or yellow sclerae. No hearing loss, sneezing, congestion, runny nose or sore throat.  Skin:  No rash or itching.  Cardiovascular: per HPI  Respiratory: per HPI  GI:  No anorexia, nausea, vomiting or diarrhea. No abdominal pain or blood.  :  No dysurea, hematuria  Neurologic:  No headache, dizziness, syncope, paralysis, ataxia, numbness or tingling in the extremities. No change in bowel or bladder control.  Musculoskeletal:  No muscle, back pain, joint pain or stiffness.  Hematologic:  No anemia, bleeding or bruising.  Lymphatics:  No enlarged nodes. No history of splenectomy.  Psychiatric:  No history of depression or anxiety.  Endocrine:  No reports of sweating, cold or heat intolerance. No polyuria or polydipsia.  Allergies:  No history of asthma, hives, eczema or rhinitis.    PHYSICAL EXAM:  BP (!) 168/76 (BP Location: Right arm, Patient Position: Sitting, Cuff Size: Adult Large)   Pulse 61   Wt 88.1 kg (194 lb 3.2 oz)   SpO2 98%   BMI 39.22 kg/m      Constitutional: awake, alert, no distress  Eyes: PERRL, sclera nonicteric  ENT: trachea midline  Respiratory: Lungs clear  Cardiovascular: Regular rate and rhythm, no murmurs  GI: nondistended, nontender, bowel sounds present  Lymph/Hematologic: no lymphadenopathy  Skin: dry, no rash  Musculoskeletal: good muscle tone, strength 5/5 in upper and lower extremities  Neurologic: no focal deficits  Neuropsychiatric: appropriate affact    DATA:  Lab:   Recent Labs   Lab Test 09/27/19  0916 09/13/18  0919  05/17/12  0750   CHOL 147 150   < > 137   HDL 66 59   < > 55   LDL 66 74   < > 70   TRIG 74 87   < > 63   CHOLHDLRATIO  --   --   --  3.0    < > = values in this interval not displayed.       ASSESSMENT:  69-year-old female seen for coronary artery disease.  She has some very subtle dyspnea symptoms, but no  chest pain.  Blood pressure has been running a little higher at home recently.  Her nuclear stress showed a new mixed inferior defect compared to stress in 2017.  Putting this altogether, she could have new obstructive coronary disease.  Of note she did have a 40% proximal and 60% mid RCA lesion on angiogram in 2016, either of these lesions could cause the stress test to be positive.    We discussed the options of ongoing medical therapy with close follow-up versus coronary angiogram.  Patient would like coronary angiogram.  The risk and benefit of the procedure was explained in detail.  She understands and wishes to proceed.  She has no bleeding history.  She has no upcoming procedures.    RECOMMENDATIONS:  1.  Coronary artery disease with abnormal stress test  - Coronary angiogram within the next few weeks  - Already on aspirin, statin, and beta-blocker  -Echo was normal last year with no valve disease, if she had disease that might require surgery, then repeat echo    Follow-up 1 to 2 weeks with BRANDON after angiogram.    Geovanni Chanel MD  Cardiology - [facility]  Pager:  [phone]  Text Page  March 9, 2020

## 2020-03-09 ENCOUNTER — OFFICE VISIT (OUTPATIENT)
Dept: CARDIOLOGY | Facility: CLINIC | Age: 70
End: 2020-03-09
Attending: NURSE PRACTITIONER
Payer: COMMERCIAL

## 2020-03-09 VITALS
DIASTOLIC BLOOD PRESSURE: 76 MMHG | OXYGEN SATURATION: 98 % | BODY MASS INDEX: 39.22 KG/M2 | HEART RATE: 61 BPM | WEIGHT: 194.2 LBS | SYSTOLIC BLOOD PRESSURE: 168 MMHG

## 2020-03-09 DIAGNOSIS — I25.10 CORONARY ARTERY DISEASE INVOLVING NATIVE CORONARY ARTERY OF NATIVE HEART WITHOUT ANGINA PECTORIS: ICD-10-CM

## 2020-03-09 PROCEDURE — 99214 OFFICE O/P EST MOD 30 MIN: CPT | Performed by: INTERNAL MEDICINE

## 2020-03-09 RX ORDER — LIDOCAINE 40 MG/G
CREAM TOPICAL
Status: CANCELLED | OUTPATIENT
Start: 2020-03-09

## 2020-03-09 RX ORDER — SODIUM CHLORIDE 9 MG/ML
INJECTION, SOLUTION INTRAVENOUS CONTINUOUS
Status: CANCELLED | OUTPATIENT
Start: 2020-03-09

## 2020-03-09 NOTE — PATIENT INSTRUCTIONS
"ANGIOGRAM Monday March 16th arrive at 8AM  [facility]   [place], [place]   Contact [facility] if needed at [phone].      1. In preparation for your procedure, we require that you do the following:  a. Nothing to eat or drink after midnight if your procedure is before 12 noon.  b. Take your usual morning medications with a small sip of water immediately upon arising on the morning of your procedure unless outlined below:    Aspirin:  o If you currently take Aspirin, continue taking your same dose.  2. You will be unable to drive after your procedure; please arrange to have someone drive you home. Make sure that there is a responsible adult with you for 24 hours after your procedure. Your procedure will be cancelled if you do not have transportation home or someone staying with you for 24 hrs.   3.  Your procedure will be done at [facility]. Please park in the  [address]amp  on the west side of [facility] on [address]. Take the skyway over [facility] to the hospital. Please check in on the first floor, \"Skyway Welcome Desk\" which is one floor down from the skyway level.   4. If you have any questions about your upcoming procedure, please contact nursing at [facility] at [phone] or at [facility] at [phone].    Follow up with Mary Jenkins NP in 7-10 days after your procedure.    "

## 2020-03-09 NOTE — LETTER
3/9/2020    Tad Martinez  [facility] [address]  [facility] [address]    RE: Sandra K Severson       Dear Colleague,    I had the pleasure of seeing Christine K SeverSeverson in the [facility].  CARDIOLOGY VISIT    REASON FOR VISIT: Coronary artery disease    SUBJECTIVE:  69-year-old female seen for follow-up of coronary artery disease.  She also has hypertension and dyslipidemia.      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  In 2016 she had chest pain and abnormal stress test.  Angiogram showed normal left main, mild LAD, 80% mid circumflex, 40% proximal RCA, 60% mid RCA, patent distal RCA stent, single drug-eluting stent placed to the mid circumflex.  Abdominal aorta was noted to have a 30-40% stenosis just proximal to the bifurcation, left iliac had 25% ostial disease.      Lexiscan nuclear stress March 2017 showed normal perfusion, inferior diaphragm attenuation, EF 64%.  Echo March 2019 showed EF 55%, mild inferolateral hypokinesis, normal RV, no valve disease.     Lexiscan nuclear stress March 2020 showed small nontransmural infarct of the basal inferior segment with medium lou-infarct ischemia, EF 58%.     Lately she has no new significant symptoms, but does feel slightly more dyspneic with exertion than 1 year ago.  She denies any overt chest pain with exertion.  She can walk on the treadmill at 3.0 mph, 0% incline with really no symptoms.  Blood pressure has been running 140/70, this is about 10-15 points higher than it was 6 months ago.    MEDICATIONS:  Current Outpatient Medications   Medication     aspirin 81 MG EC tablet     atorvastatin (LIPITOR) 80 MG tablet     Calcium Lactate 100 MG TABS     ezetimibe (ZETIA) 10 MG tablet     lisinopril (ZESTRIL) 40 MG tablet     metoprolol succinate ER (TOPROL-XL) 50 MG 24 hr tablet     Multiple Vitamins-Minerals (MULTIVITAMIN ADULTS PO)     nitroglycerin (NITROSTAT) 0.4 MG SL tablet     No current  facility-administered medications for this visit.      ALLERGIES:  Allergies   Allergen Reactions     Bupropion Hcl      rash     Cyclobenzaprine Hives     Sulfa Drugs Rash     Stated years ago had rash from sulfa drug     REVIEW OF SYSTEMS:  Constitutional:  No weight loss, fever, chills, weakness or fatigue.  HEENT:  Eyes:  No visual loss, blurred vision, double vision or yellow sclerae. No hearing loss, sneezing, congestion, runny nose or sore throat.  Skin:  No rash or itching.  Cardiovascular: per HPI  Respiratory: per HPI  GI:  No anorexia, nausea, vomiting or diarrhea. No abdominal pain or blood.  :  No dysurea, hematuria  Neurologic:  No headache, dizziness, syncope, paralysis, ataxia, numbness or tingling in the extremities. No change in bowel or bladder control.  Musculoskeletal:  No muscle, back pain, joint pain or stiffness.  Hematologic:  No anemia, bleeding or bruising.  Lymphatics:  No enlarged nodes. No history of splenectomy.  Psychiatric:  No history of depression or anxiety.  Endocrine:  No reports of sweating, cold or heat intolerance. No polyuria or polydipsia.  Allergies:  No history of asthma, hives, eczema or rhinitis.    PHYSICAL EXAM:  BP (!) 168/76 (BP Location: Right arm, Patient Position: Sitting, Cuff Size: Adult Large)   Pulse 61   Wt 88.1 kg (194 lb 3.2 oz)   SpO2 98%   BMI 39.22 kg/m      Constitutional: awake, alert, no distress  Eyes: PERRL, sclera nonicteric  ENT: trachea midline  Respiratory: Lungs clear  Cardiovascular: Regular rate and rhythm, no murmurs  GI: nondistended, nontender, bowel sounds present  Lymph/Hematologic: no lymphadenopathy  Skin: dry, no rash  Musculoskeletal: good muscle tone, strength 5/5 in upper and lower extremities  Neurologic: no focal deficits  Neuropsychiatric: appropriate affact    DATA:  Lab:   Recent Labs   Lab Test 09/27/19  0916 09/13/18  0919  05/17/12  0750   CHOL 147 150   < > 137   HDL 66 59   < > 55   LDL 66 74   < > 70   TRIG 74 87    < > 63   CHOLHDLRATIO  --   --   --  3.0    < > = values in this interval not displayed.     ASSESSMENT:  69-year-old female seen for coronary artery disease.  She has some very subtle dyspnea symptoms, but no chest pain.  Blood pressure has been running a little higher at home recently.  Her nuclear stress showed a new mixed inferior defect compared to stress in 2017.  Putting this altogether, she could have new obstructive coronary disease.  Of note she did have a 40% proximal and 60% mid RCA lesion on angiogram in 2016, either of these lesions could cause the stress test to be positive.    We discussed the options of ongoing medical therapy with close follow-up versus coronary angiogram.  Patient would like coronary angiogram.  The risk and benefit of the procedure was explained in detail.  She understands and wishes to proceed.  She has no bleeding history.  She has no upcoming procedures.    RECOMMENDATIONS:  1.  Coronary artery disease with abnormal stress test  - Coronary angiogram within the next few weeks  - Already on aspirin, statin, and beta-blocker  -Echo was normal last year with no valve disease, if she had disease that might require surgery, then repeat echo    Follow-up 1 to 2 weeks with BRANDON after angiogram.    Geovanni Chanel MD  Cardiology - [facility]  Pager:  [phone]  Text Page  March 9, 2020    Thank you for allowing me to participate in the care of your patient.    Sincerely,     Geovanni Chanel MD     [facility]

## 2020-03-16 ENCOUNTER — SURGERY (OUTPATIENT)
Age: 70
End: 2020-03-16
Payer: COMMERCIAL

## 2020-03-16 ENCOUNTER — HOSPITAL ENCOUNTER (OUTPATIENT)
Facility: CLINIC | Age: 70
Discharge: HOME | End: 2020-03-16
Admitting: INTERNAL MEDICINE
Payer: MEDICARE

## 2020-03-16 VITALS
BODY MASS INDEX: 39.29 KG/M2 | OXYGEN SATURATION: 93 % | HEIGHT: 59 IN | DIASTOLIC BLOOD PRESSURE: 68 MMHG | WEIGHT: 194.9 LBS | RESPIRATION RATE: 16 BRPM | TEMPERATURE: 97.7 F | SYSTOLIC BLOOD PRESSURE: 117 MMHG | HEART RATE: 59 BPM

## 2020-03-16 DIAGNOSIS — I25.10 CORONARY ARTERY DISEASE INVOLVING NATIVE CORONARY ARTERY OF NATIVE HEART WITHOUT ANGINA PECTORIS: ICD-10-CM

## 2020-03-16 LAB
ANION GAP SERPL CALCULATED.3IONS-SCNC: 3 MMOL/L (ref 3–14)
APTT PPP: 29 SEC (ref 22–37)
BUN SERPL-MCNC: 17 MG/DL (ref 7–30)
CALCIUM SERPL-MCNC: 9.3 MG/DL (ref 8.5–10.1)
CATH EF ESTIMATED: 53 %
CHLORIDE SERPL-SCNC: 110 MMOL/L (ref 94–109)
CHOLEST SERPL-MCNC: 147 MG/DL
CO2 SERPL-SCNC: 29 MMOL/L (ref 20–32)
CREAT SERPL-MCNC: 0.63 MG/DL (ref 0.52–1.04)
ERYTHROCYTE [DISTWIDTH] IN BLOOD BY AUTOMATED COUNT: 13.6 % (ref 10–15)
GFR SERPL CREATININE-BSD FRML MDRD: >90 ML/MIN/{1.73_M2}
GLUCOSE SERPL-MCNC: 100 MG/DL (ref 70–99)
HCT VFR BLD AUTO: 42.8 % (ref 35–47)
HDLC SERPL-MCNC: 62 MG/DL
HGB BLD-MCNC: 14.1 G/DL (ref 11.7–15.7)
INR PPP: 0.97 (ref 0.86–1.14)
LDLC SERPL CALC-MCNC: 70 MG/DL
MCH RBC QN AUTO: 31.2 PG (ref 26.5–33)
MCHC RBC AUTO-ENTMCNC: 32.9 G/DL (ref 31.5–36.5)
MCV RBC AUTO: 95 FL (ref 78–100)
NONHDLC SERPL-MCNC: 85 MG/DL
PLATELET # BLD AUTO: 236 10E9/L (ref 150–450)
POTASSIUM SERPL-SCNC: 4 MMOL/L (ref 3.4–5.3)
RBC # BLD AUTO: 4.52 10E12/L (ref 3.8–5.2)
SODIUM SERPL-SCNC: 142 MMOL/L (ref 133–144)
TRIGL SERPL-MCNC: 74 MG/DL
WBC # BLD AUTO: 8.7 10E9/L (ref 4–11)

## 2020-03-16 PROCEDURE — 93458 L HRT ARTERY/VENTRICLE ANGIO: CPT | Performed by: INTERNAL MEDICINE

## 2020-03-16 PROCEDURE — 25000132 ZZH RX MED GY IP 250 OP 250 PS 637: Mod: GY | Performed by: INTERNAL MEDICINE

## 2020-03-16 PROCEDURE — 40000065 ZZH STATISTIC EKG NON-CHARGEABLE

## 2020-03-16 PROCEDURE — 80061 LIPID PANEL: CPT | Performed by: INTERNAL MEDICINE

## 2020-03-16 PROCEDURE — 85610 PROTHROMBIN TIME: CPT | Performed by: INTERNAL MEDICINE

## 2020-03-16 PROCEDURE — 80048 BASIC METABOLIC PNL TOTAL CA: CPT | Performed by: INTERNAL MEDICINE

## 2020-03-16 PROCEDURE — 25000128 H RX IP 250 OP 636: Performed by: INTERNAL MEDICINE

## 2020-03-16 PROCEDURE — 25800030 ZZH RX IP 258 OP 636: Performed by: INTERNAL MEDICINE

## 2020-03-16 PROCEDURE — 99153 MOD SED SAME PHYS/QHP EA: CPT | Performed by: INTERNAL MEDICINE

## 2020-03-16 PROCEDURE — 25000125 ZZHC RX 250: Performed by: INTERNAL MEDICINE

## 2020-03-16 PROCEDURE — 99152 MOD SED SAME PHYS/QHP 5/>YRS: CPT | Performed by: INTERNAL MEDICINE

## 2020-03-16 PROCEDURE — C1769 GUIDE WIRE: HCPCS | Performed by: INTERNAL MEDICINE

## 2020-03-16 PROCEDURE — 36415 COLL VENOUS BLD VENIPUNCTURE: CPT

## 2020-03-16 PROCEDURE — 40000235 ZZH STATISTIC TELEMETRY

## 2020-03-16 PROCEDURE — C1887 CATHETER, GUIDING: HCPCS | Performed by: INTERNAL MEDICINE

## 2020-03-16 PROCEDURE — 93005 ELECTROCARDIOGRAM TRACING: CPT

## 2020-03-16 PROCEDURE — 93010 ELECTROCARDIOGRAM REPORT: CPT | Performed by: INTERNAL MEDICINE

## 2020-03-16 PROCEDURE — 85027 COMPLETE CBC AUTOMATED: CPT | Performed by: INTERNAL MEDICINE

## 2020-03-16 PROCEDURE — C1894 INTRO/SHEATH, NON-LASER: HCPCS | Performed by: INTERNAL MEDICINE

## 2020-03-16 PROCEDURE — 85730 THROMBOPLASTIN TIME PARTIAL: CPT | Performed by: INTERNAL MEDICINE

## 2020-03-16 PROCEDURE — 93458 L HRT ARTERY/VENTRICLE ANGIO: CPT | Mod: 26 | Performed by: INTERNAL MEDICINE

## 2020-03-16 PROCEDURE — 93571 IV DOP VEL&/PRESS C FLO 1ST: CPT | Mod: 52 | Performed by: INTERNAL MEDICINE

## 2020-03-16 PROCEDURE — 27210794 ZZH OR GENERAL SUPPLY STERILE: Performed by: INTERNAL MEDICINE

## 2020-03-16 PROCEDURE — 40000852 ZZH STATISTIC HEART CATH LAB OR EP LAB

## 2020-03-16 RX ORDER — HEPARIN SODIUM 1000 [USP'U]/ML
INJECTION, SOLUTION INTRAVENOUS; SUBCUTANEOUS
Status: DISCONTINUED | OUTPATIENT
Start: 2020-03-16 | End: 2020-03-16 | Stop reason: HOSPADM

## 2020-03-16 RX ORDER — LISINOPRIL 40 MG/1
40 TABLET ORAL DAILY
Status: DISCONTINUED | OUTPATIENT
Start: 2020-03-16 | End: 2020-03-16 | Stop reason: HOSPADM

## 2020-03-16 RX ORDER — LIDOCAINE 40 MG/G
CREAM TOPICAL
Status: DISCONTINUED | OUTPATIENT
Start: 2020-03-16 | End: 2020-03-16 | Stop reason: HOSPADM

## 2020-03-16 RX ORDER — ASPIRIN 81 MG/1
81 TABLET ORAL DAILY
Status: DISCONTINUED | OUTPATIENT
Start: 2020-03-16 | End: 2020-03-16 | Stop reason: HOSPADM

## 2020-03-16 RX ORDER — EZETIMIBE 10 MG/1
10 TABLET ORAL DAILY
Status: DISCONTINUED | OUTPATIENT
Start: 2020-03-16 | End: 2020-03-16 | Stop reason: HOSPADM

## 2020-03-16 RX ORDER — SODIUM CHLORIDE 9 MG/ML
INJECTION, SOLUTION INTRAVENOUS CONTINUOUS
Status: DISCONTINUED | OUTPATIENT
Start: 2020-03-16 | End: 2020-03-16 | Stop reason: HOSPADM

## 2020-03-16 RX ORDER — ACETAMINOPHEN 325 MG/1
650 TABLET ORAL EVERY 4 HOURS PRN
Status: DISCONTINUED | OUTPATIENT
Start: 2020-03-16 | End: 2020-03-16 | Stop reason: HOSPADM

## 2020-03-16 RX ORDER — NALOXONE HYDROCHLORIDE 0.4 MG/ML
.2-.4 INJECTION, SOLUTION INTRAMUSCULAR; INTRAVENOUS; SUBCUTANEOUS
Status: DISCONTINUED | OUTPATIENT
Start: 2020-03-16 | End: 2020-03-16 | Stop reason: HOSPADM

## 2020-03-16 RX ORDER — METOPROLOL SUCCINATE 50 MG/1
50 TABLET, EXTENDED RELEASE ORAL EVERY MORNING
Status: DISCONTINUED | OUTPATIENT
Start: 2020-03-17 | End: 2020-03-16 | Stop reason: HOSPADM

## 2020-03-16 RX ORDER — NALOXONE HYDROCHLORIDE 0.4 MG/ML
.1-.4 INJECTION, SOLUTION INTRAMUSCULAR; INTRAVENOUS; SUBCUTANEOUS
Status: DISCONTINUED | OUTPATIENT
Start: 2020-03-16 | End: 2020-03-16 | Stop reason: HOSPADM

## 2020-03-16 RX ORDER — NITROGLYCERIN 0.4 MG/1
TABLET SUBLINGUAL
Status: DISCONTINUED | OUTPATIENT
Start: 2020-03-16 | End: 2020-03-16 | Stop reason: HOSPADM

## 2020-03-16 RX ORDER — ATROPINE SULFATE 0.1 MG/ML
0.5 INJECTION INTRAVENOUS EVERY 5 MIN PRN
Status: DISCONTINUED | OUTPATIENT
Start: 2020-03-16 | End: 2020-03-16 | Stop reason: HOSPADM

## 2020-03-16 RX ORDER — FENTANYL CITRATE 50 UG/ML
INJECTION, SOLUTION INTRAMUSCULAR; INTRAVENOUS
Status: DISCONTINUED | OUTPATIENT
Start: 2020-03-16 | End: 2020-03-16 | Stop reason: HOSPADM

## 2020-03-16 RX ORDER — ATORVASTATIN CALCIUM 80 MG/1
80 TABLET, FILM COATED ORAL DAILY
Status: DISCONTINUED | OUTPATIENT
Start: 2020-03-16 | End: 2020-03-16 | Stop reason: HOSPADM

## 2020-03-16 RX ORDER — VERAPAMIL HYDROCHLORIDE 2.5 MG/ML
INJECTION, SOLUTION INTRAVENOUS
Status: DISCONTINUED | OUTPATIENT
Start: 2020-03-16 | End: 2020-03-16 | Stop reason: HOSPADM

## 2020-03-16 RX ORDER — IOPAMIDOL 755 MG/ML
INJECTION, SOLUTION INTRAVASCULAR
Status: DISCONTINUED | OUTPATIENT
Start: 2020-03-16 | End: 2020-03-16 | Stop reason: HOSPADM

## 2020-03-16 RX ORDER — FENTANYL CITRATE 50 UG/ML
25-50 INJECTION, SOLUTION INTRAMUSCULAR; INTRAVENOUS
Status: DISCONTINUED | OUTPATIENT
Start: 2020-03-16 | End: 2020-03-16 | Stop reason: HOSPADM

## 2020-03-16 RX ORDER — NITROGLYCERIN 5 MG/ML
VIAL (ML) INTRAVENOUS
Status: DISCONTINUED | OUTPATIENT
Start: 2020-03-16 | End: 2020-03-16 | Stop reason: HOSPADM

## 2020-03-16 RX ORDER — FLUMAZENIL 0.1 MG/ML
0.2 INJECTION, SOLUTION INTRAVENOUS
Status: DISCONTINUED | OUTPATIENT
Start: 2020-03-16 | End: 2020-03-16 | Stop reason: HOSPADM

## 2020-03-16 RX ADMIN — NITROGLYCERIN 200 MCG: 5 INJECTION, SOLUTION INTRAVENOUS at 14:49

## 2020-03-16 RX ADMIN — MIDAZOLAM 1 MG: 1 INJECTION INTRAMUSCULAR; INTRAVENOUS at 15:07

## 2020-03-16 RX ADMIN — ACETAMINOPHEN 650 MG: 325 TABLET, FILM COATED ORAL at 16:50

## 2020-03-16 RX ADMIN — IOPAMIDOL 170 ML: 755 INJECTION, SOLUTION INTRAVENOUS at 15:19

## 2020-03-16 RX ADMIN — NITROGLYCERIN 0.4 MG: 0.4 TABLET SUBLINGUAL at 15:08

## 2020-03-16 RX ADMIN — MIDAZOLAM 0.5 MG: 1 INJECTION INTRAMUSCULAR; INTRAVENOUS at 15:10

## 2020-03-16 RX ADMIN — MIDAZOLAM 0.5 MG: 1 INJECTION INTRAMUSCULAR; INTRAVENOUS at 15:00

## 2020-03-16 RX ADMIN — MIDAZOLAM 0.5 MG: 1 INJECTION INTRAMUSCULAR; INTRAVENOUS at 14:52

## 2020-03-16 RX ADMIN — VERAPAMIL HYDROCHLORIDE 2.5 MG: 2.5 INJECTION, SOLUTION INTRAVENOUS at 14:50

## 2020-03-16 RX ADMIN — MIDAZOLAM 0.5 MG: 1 INJECTION INTRAMUSCULAR; INTRAVENOUS at 14:31

## 2020-03-16 RX ADMIN — MIDAZOLAM 1 MG: 1 INJECTION INTRAMUSCULAR; INTRAVENOUS at 14:39

## 2020-03-16 RX ADMIN — SODIUM CHLORIDE: 9 INJECTION, SOLUTION INTRAVENOUS at 07:56

## 2020-03-16 RX ADMIN — FENTANYL CITRATE 50 MCG: 50 INJECTION, SOLUTION INTRAMUSCULAR; INTRAVENOUS at 15:07

## 2020-03-16 RX ADMIN — FENTANYL CITRATE 25 MCG: 50 INJECTION, SOLUTION INTRAMUSCULAR; INTRAVENOUS at 15:11

## 2020-03-16 RX ADMIN — LIDOCAINE HYDROCHLORIDE 1 ML: 10 INJECTION, SOLUTION EPIDURAL; INFILTRATION; INTRACAUDAL; PERINEURAL at 14:41

## 2020-03-16 RX ADMIN — HEPARIN SODIUM 5000 UNITS: 1000 INJECTION INTRAVENOUS; SUBCUTANEOUS at 14:52

## 2020-03-16 RX ADMIN — NITROGLYCERIN 200 MCG: 5 INJECTION, SOLUTION INTRAVENOUS at 15:07

## 2020-03-16 RX ADMIN — FENTANYL CITRATE 25 MCG: 50 INJECTION, SOLUTION INTRAMUSCULAR; INTRAVENOUS at 15:00

## 2020-03-16 RX ADMIN — FENTANYL CITRATE 25 MCG: 50 INJECTION, SOLUTION INTRAMUSCULAR; INTRAVENOUS at 14:31

## 2020-03-16 RX ADMIN — FENTANYL CITRATE 25 MCG: 50 INJECTION, SOLUTION INTRAMUSCULAR; INTRAVENOUS at 14:52

## 2020-03-16 RX ADMIN — FENTANYL CITRATE 50 MCG: 50 INJECTION, SOLUTION INTRAMUSCULAR; INTRAVENOUS at 14:39

## 2020-03-16 ASSESSMENT — MIFFLIN-ST. JEOR: SCORE: 1324.69

## 2020-03-16 NOTE — PROGRESS NOTES
Care Suites Admission Nursing Note    Patient Information  Name: Sandra K Severson  Age: 69 year old  Reason for admission: heart cath  Care Suites arrival time: 0730    Patient Admission/Assessment   Pre-procedure assessment complete: Yes  If abnormal assessment/labs, provider notified: N/A  NPO: Yes  Medications held per instructions/orders: N/A  Consent: deferred  If applicable, pregnancy test status: deferred  Patient oriented to room: Yes  Education/questions answered: Yes  Plan/other:     Discharge Planning  Accompanied by:   Discharge name/phone number:  Overnight post sedation caregiver:   Discharge location: home    Brook Talamantes RN

## 2020-03-16 NOTE — PRE-PROCEDURE
GENERAL PRE-PROCEDURE:   Procedure:  Coronary angiogram, left heart catheterization, left ventriculogram and possible intervention.  Date/Time:  3/16/2020 2:34 PM    Written consent obtained?: Yes    Risks and benefits: Risks, benefits and alternatives were discussed    Consent given by:  Patient  Patient states understanding of procedure being performed: Yes    Patient's understanding of procedure matches consent: Yes    Procedure consent matches procedure scheduled: Yes    Expected level of sedation:  Moderate  Appropriately NPO:  Yes  ASA Class:  Class 2- mild systemic disease, no acute problems, no functional limitations  Mallampati  :  Grade 2- soft palate, base of uvula, tonsillar pillars, and portion of posterior pharyngeal wall visible  Lungs:  Lungs clear with good breath sounds bilaterally  Heart:  Normal heart sounds and rate  History & Physical reviewed:  History and physical reviewed and no updates needed  Statement of review:  I have reviewed the lab findings, diagnostic data, medications, and the plan for sedation

## 2020-03-16 NOTE — DISCHARGE INSTRUCTIONS
Cardiac Angiogram Discharge Instructions - Radial with Dr. Ellington    After you go home:      Have an adult stay with you until tomorrow.    Drink extra fluids for 2 days.    You may resume your normal diet.    No smoking       For 24 hours - due to the sedation you received:    Relax and take it easy.    Do NOT make any important or legal decisions.    Do NOT drive or operate machines at home or at work.    Do NOT drink alcohol.    Care of Wrist Puncture Site:      For the first 24 hrs - check the puncture site every 1-2 hours while awake.    It is normal to have soreness at the puncture site and mild tingling in your hand for up to 3 days.    Remove the bandaid after 24 hours. If there is minor oozing, apply another bandaid and remove it after 12 hours.    You may shower tomorrow.  Do NOT take a bath, or use a hot tub or pool for at least 3 days. Do NOT scrub the site. Do not use lotion or powder near the puncture site.           Activity:        For 2 days:     do not use your hand or arm to support your weight (such as rising from a chair)     do not bend your wrist (such as lifting a garage door).    do not lift more than 5 pounds or exercise your arm (such as tennis, golf or bowling).    Do NOT do any heavy activity such as exercise, lifting, or straining.     Bleeding:      If you start bleeding from the site in your wrist, sit down and press firmly on/above the site for 10 minutes.     Once bleeding stops, keep arm still for 2 hours.     Call [facility] as soon as you can.       Call 911 right away if you have heavy bleeding or bleeding that does not stop.      Medicines:      If you are taking an antiplatelet medication such as Plavix, Brilinta or Effient, do not stop taking it until you talk to your cardiologist.        If you are on Metformin (Glucophage), do not restart it until you have blood tests (within 2 to 3 days after discharge).  After you have your blood drawn, you may restart the Metformin.      Take your medications, including blood thinners, unless your provider tells you not to.  If you take Coumadin (Warfarin), have your INR checked by your provider in  3-5 days. Call your clinic to schedule this.    If you have stopped any medicines, check with your provider about when to restart them.    Follow Up Appointments:      Follow up with [facility] Nurse Practitioner at [facility] of patient preference in 7-10 days.    Call the clinic if:      You have a large or growing hard lump around the site.    The site is red, swollen, hot or tender.    Blood or fluid is draining from the site.    You have chills or a fever greater than 101 F (38 C).    Your arm feels numb, cool or changes color.    You have hives, a rash or unusual itching.    Any questions or concerns.          [facility] [place]:    [phone] [facility] (7 days a week)

## 2020-03-16 NOTE — PROGRESS NOTES
Care Suites Post Procedure Note    Patient Information  Name: Sandra K Severson  Age: 69 year old    Post Procedure  Procedure: coronary angiogram  Time patient returned to Care Suites: 1530  Concerns/abnormal assessment: TR band to (L) wrist without drainage or hematoma. 12ml air in band.  If abnormal assessment, provider notified: N/A  Plan/Other: per post procedure TR band protocol. Discharge later today if stable. Review AVS with patient and spouse.    Gabbi Butt RN

## 2020-03-16 NOTE — PROGRESS NOTES
Pt and  updated on delay of procedure. They were told that we can not give them an exact time, but will update them accordingly.

## 2020-03-16 NOTE — PROGRESS NOTES
PATIENT WELLNESS SCREENING    Step 1: Answer all screening questions 1-3.    1. In the last month, have you been in contact with someone who was confirmed or suspected to have Coronavirus/COVID-19? No    2. Do you have the following symptoms?   Fever? No   Cough? No   Shortness of breath? No   Skin rash? No    3. Have you traveled internationally in the last month? No   If so, where?     [place]  (Level 3, avoid all non-essential travel)  [place]  (Level 3, avoid all non-essential travel)  [place]  (Level 3, avoid all non-essential travel)  [place]  (Level 3, avoid all non-essential travel)    [place], [place], [place], [place], [place], [place], [place], [place], [place], [place], [place], [place], [place], [place], [place], [place], [place], [place], [place], [place], [place], [place], [place], [place], [place], [place], [facility], [place], [facility]    Step 2: Refer to logic grid below for actions    SYMPTOM(S) ONLY  (no travel or exposure)  *Fever  *Rash  *Cough  *Shortness of Breath    ACTIONS  1. Mask patient  2. Standard rooming process  3. Provider to assess per normal protocol    SUSPECT  (symptoms with travel and/or exposure)  *At least 1 symptom AND travel OR exposure  *At least 1 symptom AND travel AND exposure    ACTIONS  1. Mask patient  2. Room patient as soon as possible  3. Provider evaluation  4. Consult Infection Prevention as needed

## 2020-03-17 NOTE — PROGRESS NOTES
Care Suites Discharge Nursing Note    Patient Information  Name: Sandra K Severson  Age: 69 year old    Discharge Education:  Discharge instructions reviewed: Yes.  Patient/patient representative verbalizes understanding of discharge instructions: Yes, AVS reviewed with patient and spouse.  Patient discharging on new medications: No    Discharge Plans:   Discharge location: Care suites to home  Discharge ride contacted:  at bedside  Approximate discharge time: 1900    Discharge Criteria:  Discharge criteria met and vital signs stable: VSS, ambulated, ate meal and voided. (L) radial site stable.    Patient Belongs:  Patient belongings returned to patient: yes  IV and monitor dc'd.    Gabbi Butt RN

## 2020-03-18 LAB — INTERPRETATION ECG - MUSE: NORMAL

## 2020-03-23 ENCOUNTER — VIRTUAL VISIT (OUTPATIENT)
Dept: CARDIOLOGY | Facility: CLINIC | Age: 70
End: 2020-03-23
Payer: COMMERCIAL

## 2020-03-23 VITALS
HEART RATE: 90 BPM | BODY MASS INDEX: 37.77 KG/M2 | SYSTOLIC BLOOD PRESSURE: 136 MMHG | WEIGHT: 187 LBS | DIASTOLIC BLOOD PRESSURE: 84 MMHG

## 2020-03-23 DIAGNOSIS — E78.5 HYPERLIPIDEMIA LDL GOAL <70: ICD-10-CM

## 2020-03-23 DIAGNOSIS — I10 BENIGN ESSENTIAL HYPERTENSION: ICD-10-CM

## 2020-03-23 DIAGNOSIS — I25.10 CORONARY ARTERY DISEASE INVOLVING NATIVE CORONARY ARTERY OF NATIVE HEART WITHOUT ANGINA PECTORIS: Primary | ICD-10-CM

## 2020-03-23 PROCEDURE — 99213 OFFICE O/P EST LOW 20 MIN: CPT | Mod: TEL | Performed by: NURSE PRACTITIONER

## 2020-03-23 RX ORDER — AMLODIPINE BESYLATE 2.5 MG/1
2.5 TABLET ORAL DAILY
Qty: 90 TABLET | Refills: 3 | Status: SHIPPED | OUTPATIENT
Start: 2020-03-23

## 2020-03-23 NOTE — PATIENT INSTRUCTIONS
Medication Changes:  1. Take metoprolol in the evening to help with fatigue   2. START amlodipine 2.5 mg daily     Recommendations:  1. Check blood pressure at least 1 hour after medications. Call the clinic if your blood pressure is consistently greater than 130/80.   2. Call if leg swelling     Follow-up:  See Dr. Chanel for cardiology follow up at [place]: Sept 2020. Call in June to schedule.     Cardiology Scheduling~[phone]  Cardiology Clinic RNs~[phone] (Felicia [place] RN and Rocio Mckenzie RN)

## 2020-03-23 NOTE — PROGRESS NOTES
"Sandra K Severson is a 69 year old female who is being evaluated via a billable telephone visit.      The patient has been notified of following:     \"This telephone visit will be conducted via a call between you and your physician/provider. We have found that certain health care needs can be provided without the need for a physical exam.  This service lets us provide the care you need with a short phone conversation.  If a prescription is necessary we can send it directly to your pharmacy.  If lab work is needed we can place an order for that and you can then stop by our lab to have the test done at a later time.    If during the course of the call the physician/provider feels a telephone visit is not appropriate, you will not be charged for this service.\"     Sandra K Severson complains of    Chief Complaint   Patient presents with     RECHECK     post angiogram       I have reviewed and updated the patient's Past Medical History, Social History, Family History and Medication List.    ALLERGIES  Bupropion hcl; Cyclobenzaprine; and Sulfa drugs    Additional provider notes: see dictation    Assessment/Plan:  1. Coronary artery disease involving native coronary artery of native heart without angina pectoris      2. Benign essential hypertension    - amLODIPine (NORVASC) 2.5 MG tablet; Take 1 tablet (2.5 mg) by mouth daily  Dispense: 90 tablet; Refill: 3  - Follow-Up with Cardiologist; Future    3. Hyperlipidemia LDL goal <70      Phone call duration: 17 minutes    TIMOTHY Sparks CNP          Cardiology Clinic Progress Note  Sandra K Severson MRN# [id number]   YOB: 1950 Age: 67 year old     Primary Cardiologist:   Dr. Chanel           History of Presenting Illness:    Sandra K Severson is a pleasant 67 year old patient with a past cardiac history significant for   1. CAD  2. hypertension,   3. hyperlipidemia    Status post inferior MI 2001 with thrombectomy and stent to the  RCA. " In 2016, she had chest pain and abnormal stress test. Angiogram with stent to the mid circumflex. She was also noted to have 30-40% stenosis in the abdominal aorta just proximal to bifurcation and 25% ostial disease in the left iliac. Lexiscan 2017 with normal perfusion.  Echocardiogram March 2019 with normal EF, mild inferolateral hypokinesis, normal RV, no significant valvular disease.  Lexiscan nuclear stress test     Patient was last seen by Dr. Chanel on 3/9/2020.  March 2020 showed small nontransmural infarction of the basal inferior segment with medium lou-infarct ischemia, EF 58%.  She felt slightly more ESPINOZA compared to 1 year prior and was able to walk on the treadmill at 3 mph with no symptoms.  Blood pressure was mildly elevated.    Pt presents today for angiogram follow-up.  Coronary angiogram 3/16/2020 showing both previously placed stents widely patent with mild to moderate disease throughout, negative IFR of the RCA.  These results were reviewed with her today.    Since she was last seen, her dyspnea on exertion has been stable and unchanged.  With her left radial angiogram site she had some ecchymosis which is improving, no bleeding, tenderness, numbness, or tingling.  Blood pressure remains mildly elevated and she is agreeable to starting amlodipine.  She does have concerns of bladder infection and I have referred her to [email or web address] so she does not have to go in for a clinic visit during [place] [id number]. Patient reports no chest pain, PND, orthopnea, presyncope, syncope, edema, heart racing, or palpitations.      Current Cardiac Medications    metoprolol XL 50 mg daily  Lisinopril 40 mg daily  Atorvastatin 80 mg daily  Nitroglycerin p.r.n.  Aspirin 81 mg daily   Zetia 10 mg daily                     Assessment and Plan:     Plan  1. Take metoprolol in the evening to help with fatigue   2. START amlodipine 2.5 mg daily for HTN    Recommendations:  1. Check blood pressure at least 1 hour after medications.  Call the clinic if your blood pressure is consistently greater than 130/80.   2. Call if leg swelling     Follow-up:  See Dr. Chanel for cardiology follow up at [facility]: Sept 2020. Call in Belinda to schedule.         1. CAD    status post inferior MI 2001 with stent to the RCA    stent to the midcircumflex in 2016    Angio 3/2020 widely patent stents with mild to moderate disease throughout, negative IFR of the RCA    no angina (chest heaviness, jaw and arm pain)     Continue statin, aspirin, ACE inhibitor, beta blocker      2. Hypertension    not well controlled     continue metoprolol, lisinopril    Consider starting amlodipine if needed       3. Hyperlipidemia    LDL 70 3/2020     continue atorvastatin 80 mg daily and zetia          Thank you for allowing me to participate in this delightful patient's care.      This note was completed in part using Dragon voice recognition software. Although reviewed after completion, some word and grammatical errors may occur.    Mary Jenkins, APRN, CNP           Data:   All laboratory data reviewed        HPI and Plan:   See dictation    Orders Placed This Encounter   Procedures     Follow-Up with Cardiologist       Orders Placed This Encounter   Medications     amLODIPine (NORVASC) 2.5 MG tablet     Sig: Take 1 tablet (2.5 mg) by mouth daily     Dispense:  90 tablet     Refill:  3       There are no discontinued medications.      Encounter Diagnoses   Name Primary?     Coronary artery disease involving native coronary artery of native heart without angina pectoris Yes     Benign essential hypertension      Hyperlipidemia LDL goal <70        CURRENT MEDICATIONS:  Current Outpatient Medications   Medication Sig Dispense Refill     amLODIPine (NORVASC) 2.5 MG tablet Take 1 tablet (2.5 mg) by mouth daily 90 tablet 3     aspirin 81 MG EC tablet Take 1 tablet (81 mg) by mouth daily Start tomorrow morning. 90 tablet 3     atorvastatin (LIPITOR) 80 MG tablet Take 1  "tablet (80 mg) by mouth daily 90 tablet 3     Calcium Lactate 100 MG TABS        ezetimibe (ZETIA) 10 MG tablet Take 1 tablet (10 mg) by mouth daily 90 tablet 3     lisinopril (ZESTRIL) 40 MG tablet Take 1 tablet (40 mg) by mouth daily 90 tablet 3     metoprolol succinate ER (TOPROL-XL) 50 MG 24 hr tablet Take 1 tablet (50 mg) by mouth every morning 90 tablet 3     Multiple Vitamins-Minerals (MULTIVITAMIN ADULTS PO)        nitroglycerin (NITROSTAT) 0.4 MG SL tablet One tablet under the tongue every 5 minutes if needed for chest pain. May repeat every 5 minutes for a maximum of 3 doses in 15 minutes\" 25 tablet 3       ALLERGIES     Allergies   Allergen Reactions     Bupropion Hcl      rash     Cyclobenzaprine Hives     Sulfa Drugs Rash     Stated years ago had rash from sulfa drug       PAST MEDICAL HISTORY:  Past Medical History:   Diagnosis Date     Abnormal stress ECG      Fibromyalgia      Hyperlipidemia      Hypertension      MI (myocardial infarction) (H) age 51    MI in 2001     Myocardial infarction (H)        PAST SURGICAL HISTORY:  Past Surgical History:   Procedure Laterality Date     COLONOSCOPY  11/14/2011    Procedure:COLONOSCOPY; Screening Colonoscopy; Surgeon:ALISHA JOY; Location:[facility]     CV HEART CATHETERIZATION WITH POSSIBLE INTERVENTION N/A 3/16/2020    Procedure: Coronary Angiogram;  Surgeon: Alejandro Ellington MD;  Location:  HEART CARDIAC CATH LAB     CV INSTANTANEOUS WAVE-FREE RATIO N/A 3/16/2020    Procedure: Instantaneous Wave-Free Ratio;  Surgeon: Alejandro Ellington MD;  Location:  HEART CARDIAC CATH LAB     CV LEFT HEART CATH N/A 3/16/2020    Procedure: Left Heart Cath;  Surgeon: Alejandro Ellington MD;  Location:  HEART CARDIAC CATH LAB     CV LEFT VENTRICULOGRAM N/A 3/16/2020    Procedure: Left Ventriculogram;  Surgeon: Alejandro Ellington MD;  Location:  HEART CARDIAC CATH LAB     D & C  1980s    D&C     SURGICAL HISTORY OF -   2001    Status post MI,      " SURGICAL HISTORY OF -       Stents x2       FAMILY HISTORY:  Family History   Problem Relation Age of Onset     Heart Disease Mother         congenital,  at 82     Diabetes Mother      Gastrointestinal Disease Mother         colon polyps non cncerous     Heart Disease Father         first MI in mid-50s     Cerebrovascular Disease Father          at 61     Neurologic Disorder Maternal Grandfather         brain hemmorage     Heart Disease Brother          of MI in early 50s     Hypertension Daughter      Cancer Sister         endometrial cancer     Gastrointestinal Disease Sister         colon polyps non cancerous       SOCIAL HISTORY:  Social History     Socioeconomic History     Marital status:      Spouse name: None     Number of children: None     Years of education: None     Highest education level: None   Occupational History     None   Social Needs     Financial resource strain: None     Food insecurity     Worry: None     Inability: None     Transportation needs     Medical: None     Non-medical: None   Tobacco Use     Smoking status: Former Smoker     Last attempt to quit: 10/13/2001     Years since quittin.4     Smokeless tobacco: Never Used   Substance and Sexual Activity     Alcohol use: Yes     Comment: rarely     Drug use: No     Sexual activity: Yes     Partners: Male   Lifestyle     Physical activity     Days per week: None     Minutes per session: None     Stress: None   Relationships     Social connections     Talks on phone: None     Gets together: None     Attends Restorationism service: None     Active member of club or organization: None     Attends meetings of clubs or organizations: None     Relationship status: None     Intimate partner violence     Fear of current or ex partner: None     Emotionally abused: None     Physically abused: None     Forced sexual activity: None   Other Topics Concern     Parent/sibling w/ CABG, MI or angioplasty before 65F 55M? Yes     Comment:  father and brother   Social History Narrative        Daughter with severe hypertension    Healthy son    Works as chemical dependency technician       Review of Systems:  Skin:          Eyes:         ENT:         Respiratory:          Cardiovascular:         Gastroenterology:        Genitourinary:         Musculoskeletal:         Neurologic:         Psychiatric:         Heme/Lymph/Imm:         Endocrine:           Physical Exam:  Vitals: /84   Pulse 90   Wt 84.8 kg (187 lb)   BMI 37.77 kg/m      Constitutional:           Skin:             Head:           Eyes:           Lymph:      ENT:           Neck:           Respiratory:            Cardiac:                                                           GI:           Extremities and Muscular Skeletal:                 Neurological:           Psych:           CC  Geovanni Chanel MD  [facility]  [address] [place] [address]

## 2020-04-02 ENCOUNTER — TELEPHONE (OUTPATIENT)
Dept: CARDIOLOGY | Facility: CLINIC | Age: 70
End: 2020-04-02

## 2020-04-02 DIAGNOSIS — I10 BENIGN ESSENTIAL HYPERTENSION: ICD-10-CM

## 2020-04-02 NOTE — TELEPHONE ENCOUNTER
Pt called stating BP has been low since starting new med.  Started amlodipine 2.5mg on 3/27/20    3/28  102/68  3/29  100/58  3/30  92/52, 97/60    Stats she was lightheaded and fatigued.  She has been taking metoprolol in the evening.    Stopped taking for 2 days then    Today 4/2/20  /76.    Will route to Mary Jenkins NP for recommendations.  KATHARINE GRANT RN on 4/2/2020 at 12:07 PM      ADDENDUM:   She can try 1/2 tablet of the amlodipine 2.5 mg. Call if blood pressure is less than 90 on top or less than 100 with lightheadedness. If unable to cut them in half then discontinue amlodipine and allow for higher BP. Call if BP is consistently higher than 150 systolic. Other antihypertensives would require lab monitoring which I would like to avoid right now with COVID19 concerns. If able to cut tablet in half and BP is less than 130 then continue with follow-up as planned. Otherwise, have her follow-up with me in July and we will try a different antihypertensive.   TIMOTHY Sparks CNP    ADDENDUM:  Spoke with pt.  She verbalized understanding.She will try to cut pill in half.    KATHARINE GRANT RN on 4/3/2020 at 8:50 AM

## 2020-09-11 NOTE — PROGRESS NOTES
CARDIOLOGY VISIT    REASON FOR VISIT: CAD    SUBJECTIVE:  69-year-old female seen for follow-up of coronary artery disease.  She also has hypertension and dyslipidemia.       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  In 2016 she had chest pain and abnormal stress test.  Angiogram showed normal left main, mild LAD, 80% mid circumflex, 40% proximal RCA, 60% mid RCA, patent distal RCA stent, single drug-eluting stent placed to the mid circumflex.  Abdominal aorta was noted to have a 30-40% stenosis just proximal to the bifurcation, left iliac had 25% ostial disease.       Lexiscan nuclear stress March 2017 showed normal perfusion, inferior diaphragm attenuation, EF 64%.  Echo March 2019 showed EF 55%, mild inferolateral hypokinesis, normal RV, no valve disease.      Lexiscan nuclear stress March 2020 showed small nontransmural infarct of the basal inferior segment with medium lou-infarct ischemia, EF 58%.     Coronary angiogram March 2020 showed mild to moderate diffuse coronary disease, previous stents widely patent, no intervention.     She has been doing well recently.  Blood pressure dropped quite significantly after amlodipine 2.5 mg was started.  She is now taking a half dose of this and blood pressure tends to run 110 or less.  She denies any chest pain.  She has a new dog and is walking up to 30 minutes.  She is limited at times because of low back pain from a herniated disc.    MEDICATIONS:  Current Outpatient Medications   Medication     amLODIPine (NORVASC) 2.5 MG tablet     aspirin 81 MG EC tablet     atorvastatin (LIPITOR) 80 MG tablet     Calcium Lactate 100 MG TABS     ezetimibe (ZETIA) 10 MG tablet     lisinopril (ZESTRIL) 40 MG tablet     metoprolol succinate ER (TOPROL-XL) 50 MG 24 hr tablet     Multiple Vitamins-Minerals (MULTIVITAMIN ADULTS PO)     nitroglycerin (NITROSTAT) 0.4 MG SL tablet     No current facility-administered medications for this visit.         ALLERGIES:  Allergies   Allergen Reactions     Bupropion Hcl      rash     Cyclobenzaprine Hives     Sulfa Drugs Rash     Stated years ago had rash from sulfa drug       REVIEW OF SYSTEMS:  Constitutional:  No weight loss, fever, chills, weakness or fatigue.  HEENT:  Eyes:  No visual loss, blurred vision, double vision or yellow sclerae. No hearing loss, sneezing, congestion, runny nose or sore throat.  Skin:  No rash or itching.  Cardiovascular: per HPI  Respiratory: per HPI  GI:  No anorexia, nausea, vomiting or diarrhea. No abdominal pain or blood.  :  No dysurea, hematuria  Neurologic:  No headache, dizziness, syncope, paralysis, ataxia, numbness or tingling in the extremities. No change in bowel or bladder control.  Musculoskeletal:  No muscle, back pain, joint pain or stiffness.  Hematologic:  No anemia, bleeding or bruising.  Lymphatics:  No enlarged nodes. No history of splenectomy.  Psychiatric:  No history of depression or anxiety.  Endocrine:  No reports of sweating, cold or heat intolerance. No polyuria or polydipsia.  Allergies:  No history of asthma, hives, eczema or rhinitis.    PHYSICAL EXAM:  /70 (BP Location: Right arm, Patient Position: Sitting, Cuff Size: Adult Regular)   Pulse 73   Wt 85.3 kg (188 lb)   SpO2 94%   BMI 37.97 kg/m    Constitutional: awake, alert, no distress  Eyes: PERRL, sclera nonicteric  ENT: trachea midline  Respiratory: Lungs clear  Cardiovascular: Regular rate and rhythm, no murmurs  GI: nondistended, nontender, bowel sounds present  Lymph/Hematologic: no lymphadenopathy  Skin: dry, no rash  Musculoskeletal: good muscle tone, strength 5/5 in upper and lower extremities  Neurologic: no focal deficits  Neuropsychiatric: appropriate affact    DATA:  Lab:   Recent Labs   Lab Test 03/16/20  0750 09/27/19  0916   CHOL 147 147   HDL 62 66   LDL 70 66   TRIG 74 74       ASSESSMENT:   69-year-old female seen for follow-up of coronary disease.  She is doing well and  has no concerning symptoms.  Blood pressure is well controlled.  No additional testing is needed.    She may be switching healthcare systems, she lives in [place], [place].  She is thinking about being closer to her home town.    RECOMMENDATIONS:  1.  Coronary artery disease with previous stent  -Continue current medications    2.  Hypertension  -Controlled on current therapy    3.  Dyslipidemia  -Lipids at goal, continue atorvastatin    Follow-up in 1 year with BRANDON.  However patient may change to a different system to be closer to home.    Geovanni Chanel MD  Cardiology - [facility]  Pager:  [phone]  Text Page  September 17, 2020

## 2020-09-17 ENCOUNTER — OFFICE VISIT (OUTPATIENT)
Dept: CARDIOLOGY | Facility: CLINIC | Age: 70
End: 2020-09-17
Attending: NURSE PRACTITIONER
Payer: COMMERCIAL

## 2020-09-17 VITALS
OXYGEN SATURATION: 94 % | WEIGHT: 188 LBS | DIASTOLIC BLOOD PRESSURE: 70 MMHG | BODY MASS INDEX: 37.97 KG/M2 | HEART RATE: 73 BPM | SYSTOLIC BLOOD PRESSURE: 117 MMHG

## 2020-09-17 DIAGNOSIS — I25.10 CORONARY ARTERY DISEASE INVOLVING NATIVE CORONARY ARTERY OF NATIVE HEART WITHOUT ANGINA PECTORIS: Primary | ICD-10-CM

## 2020-09-17 DIAGNOSIS — I10 BENIGN ESSENTIAL HYPERTENSION: ICD-10-CM

## 2020-09-17 DIAGNOSIS — I25.10 CORONARY ARTERY DISEASE INVOLVING NATIVE CORONARY ARTERY OF NATIVE HEART WITHOUT ANGINA PECTORIS: ICD-10-CM

## 2020-09-17 LAB
ANION GAP SERPL CALCULATED.3IONS-SCNC: 2 MMOL/L (ref 3–14)
BUN SERPL-MCNC: 12 MG/DL (ref 7–30)
CALCIUM SERPL-MCNC: 9.3 MG/DL (ref 8.5–10.1)
CHLORIDE SERPL-SCNC: 108 MMOL/L (ref 94–109)
CHOLEST SERPL-MCNC: 137 MG/DL
CO2 SERPL-SCNC: 32 MMOL/L (ref 20–32)
CREAT SERPL-MCNC: 0.61 MG/DL (ref 0.52–1.04)
ERYTHROCYTE [DISTWIDTH] IN BLOOD BY AUTOMATED COUNT: 13.5 % (ref 10–15)
GFR SERPL CREATININE-BSD FRML MDRD: >90 ML/MIN/{1.73_M2}
GLUCOSE SERPL-MCNC: 90 MG/DL (ref 70–99)
HCT VFR BLD AUTO: 45 % (ref 35–47)
HDLC SERPL-MCNC: 64 MG/DL
HGB BLD-MCNC: 14.8 G/DL (ref 11.7–15.7)
LDLC SERPL CALC-MCNC: 53 MG/DL
MCH RBC QN AUTO: 31.6 PG (ref 26.5–33)
MCHC RBC AUTO-ENTMCNC: 32.9 G/DL (ref 31.5–36.5)
MCV RBC AUTO: 96 FL (ref 78–100)
NONHDLC SERPL-MCNC: 73 MG/DL
PLATELET # BLD AUTO: 204 10E9/L (ref 150–450)
POTASSIUM SERPL-SCNC: 4.4 MMOL/L (ref 3.4–5.3)
RBC # BLD AUTO: 4.68 10E12/L (ref 3.8–5.2)
SODIUM SERPL-SCNC: 142 MMOL/L (ref 133–144)
TRIGL SERPL-MCNC: 99 MG/DL
WBC # BLD AUTO: 7.9 10E9/L (ref 4–11)

## 2020-09-17 PROCEDURE — 85027 COMPLETE CBC AUTOMATED: CPT | Performed by: INTERNAL MEDICINE

## 2020-09-17 PROCEDURE — 80061 LIPID PANEL: CPT | Performed by: INTERNAL MEDICINE

## 2020-09-17 PROCEDURE — 80048 BASIC METABOLIC PNL TOTAL CA: CPT | Performed by: INTERNAL MEDICINE

## 2020-09-17 PROCEDURE — 36415 COLL VENOUS BLD VENIPUNCTURE: CPT | Performed by: INTERNAL MEDICINE

## 2020-09-17 PROCEDURE — 99214 OFFICE O/P EST MOD 30 MIN: CPT | Performed by: INTERNAL MEDICINE

## 2020-09-17 NOTE — PATIENT INSTRUCTIONS
1. Keep medications the same    2. Goal blood pressure is 130/80 or less    3. Follow up with cardiology in one year    Recent Labs   Lab Test 09/17/20  0801 03/16/20  0750   CHOL 137 147   HDL 64 62   LDL 53 70   TRIG 99 74

## 2020-09-17 NOTE — LETTER
9/17/2020    Tad TAMMY Martinez  [facility] [address]  [facility] [address]    RE: Sandra K Severson       Dear Colleague,    I had the pleasure of seeing Christine K Severson in the [facility].    CARDIOLOGY VISIT    REASON FOR VISIT: CAD    SUBJECTIVE:  69-year-old female seen for follow-up of coronary artery disease.  She also has hypertension and dyslipidemia.       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  In 2016 she had chest pain and abnormal stress test.  Angiogram showed normal left main, mild LAD, 80% mid circumflex, 40% proximal RCA, 60% mid RCA, patent distal RCA stent, single drug-eluting stent placed to the mid circumflex.  Abdominal aorta was noted to have a 30-40% stenosis just proximal to the bifurcation, left iliac had 25% ostial disease.       Lexiscan nuclear stress March 2017 showed normal perfusion, inferior diaphragm attenuation, EF 64%.  Echo March 2019 showed EF 55%, mild inferolateral hypokinesis, normal RV, no valve disease.      Lexiscan nuclear stress March 2020 showed small nontransmural infarct of the basal inferior segment with medium lou-infarct ischemia, EF 58%.     Coronary angiogram March 2020 showed mild to moderate diffuse coronary disease, previous stents widely patent, no intervention.     She has been doing well recently.  Blood pressure dropped quite significantly after amlodipine 2.5 mg was started.  She is now taking a half dose of this and blood pressure tends to run 110 or less.  She denies any chest pain.  She has a new dog and is walking up to 30 minutes.  She is limited at times because of low back pain from a herniated disc.    MEDICATIONS:  Current Outpatient Medications   Medication     amLODIPine (NORVASC) 2.5 MG tablet     aspirin 81 MG EC tablet     atorvastatin (LIPITOR) 80 MG tablet     Calcium Lactate 100 MG TABS     ezetimibe (ZETIA) 10 MG tablet     lisinopril (ZESTRIL) 40 MG tablet      metoprolol succinate ER (TOPROL-XL) 50 MG 24 hr tablet     Multiple Vitamins-Minerals (MULTIVITAMIN ADULTS PO)     nitroglycerin (NITROSTAT) 0.4 MG SL tablet     No current facility-administered medications for this visit.        ALLERGIES:  Allergies   Allergen Reactions     Bupropion Hcl      rash     Cyclobenzaprine Hives     Sulfa Drugs Rash     Stated years ago had rash from sulfa drug       REVIEW OF SYSTEMS:  Constitutional:  No weight loss, fever, chills, weakness or fatigue.  HEENT:  Eyes:  No visual loss, blurred vision, double vision or yellow sclerae. No hearing loss, sneezing, congestion, runny nose or sore throat.  Skin:  No rash or itching.  Cardiovascular: per HPI  Respiratory: per HPI  GI:  No anorexia, nausea, vomiting or diarrhea. No abdominal pain or blood.  :  No dysurea, hematuria  Neurologic:  No headache, dizziness, syncope, paralysis, ataxia, numbness or tingling in the extremities. No change in bowel or bladder control.  Musculoskeletal:  No muscle, back pain, joint pain or stiffness.  Hematologic:  No anemia, bleeding or bruising.  Lymphatics:  No enlarged nodes. No history of splenectomy.  Psychiatric:  No history of depression or anxiety.  Endocrine:  No reports of sweating, cold or heat intolerance. No polyuria or polydipsia.  Allergies:  No history of asthma, hives, eczema or rhinitis.    PHYSICAL EXAM:  /70 (BP Location: Right arm, Patient Position: Sitting, Cuff Size: Adult Regular)   Pulse 73   Wt 85.3 kg (188 lb)   SpO2 94%   BMI 37.97 kg/m    Constitutional: awake, alert, no distress  Eyes: PERRL, sclera nonicteric  ENT: trachea midline  Respiratory: Lungs clear  Cardiovascular: Regular rate and rhythm, no murmurs  GI: nondistended, nontender, bowel sounds present  Lymph/Hematologic: no lymphadenopathy  Skin: dry, no rash  Musculoskeletal: good muscle tone, strength 5/5 in upper and lower extremities  Neurologic: no focal deficits  Neuropsychiatric: appropriate  affact    DATA:  Lab:   Recent Labs   Lab Test 03/16/20  0750 09/27/19  0916   CHOL 147 147   HDL 62 66   LDL 70 66   TRIG 74 74       ASSESSMENT:   69-year-old female seen for follow-up of coronary disease.  She is doing well and has no concerning symptoms.  Blood pressure is well controlled.  No additional testing is needed.    She may be switching healthcare systems, she lives in [place], [place].  She is thinking about being closer to her home town.    RECOMMENDATIONS:  1.  Coronary artery disease with previous stent  -Continue current medications    2.  Hypertension  -Controlled on current therapy    3.  Dyslipidemia  -Lipids at goal, continue atorvastatin    Follow-up in 1 year with BRANDON.  However patient may change to a different system to be closer to home.    Geovanni Chanel MD  Cardiology - [facility]  Pager:  [phone]  Text Page  September 17, 2020        Thank you for allowing me to participate in the care of your patient.      Sincerely,     Geovanni Chanel MD     [facility]    cc:   Mary Jenkins, APRN CNP  [address]  [place] [address]

## 2020-09-17 NOTE — LETTER
9/17/2020    Tad TAMMY Martinez  [facility] [address]  [facility] [id number]    RE: Sandra K Severson       Dear Colleague,    I had the pleasure of seeing Christine K Severson in the [facility].    CARDIOLOGY VISIT    REASON FOR VISIT: CAD    SUBJECTIVE:  69-year-old female seen for follow-up of coronary artery disease.  She also has hypertension and dyslipidemia.       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  In 2016 she had chest pain and abnormal stress test.  Angiogram showed normal left main, mild LAD, 80% mid circumflex, 40% proximal RCA, 60% mid RCA, patent distal RCA stent, single drug-eluting stent placed to the mid circumflex.  Abdominal aorta was noted to have a 30-40% stenosis just proximal to the bifurcation, left iliac had 25% ostial disease.       Lexiscan nuclear stress March 2017 showed normal perfusion, inferior diaphragm attenuation, EF 64%.  Echo March 2019 showed EF 55%, mild inferolateral hypokinesis, normal RV, no valve disease.      Lexiscan nuclear stress March 2020 showed small nontransmural infarct of the basal inferior segment with medium lou-infarct ischemia, EF 58%.     Coronary angiogram March 2020 showed mild to moderate diffuse coronary disease, previous stents widely patent, no intervention.     She has been doing well recently.  Blood pressure dropped quite significantly after amlodipine 2.5 mg was started.  She is now taking a half dose of this and blood pressure tends to run 110 or less.  She denies any chest pain.  She has a new dog and is walking up to 30 minutes.  She is limited at times because of low back pain from a herniated disc.    MEDICATIONS:  Current Outpatient Medications   Medication     amLODIPine (NORVASC) 2.5 MG tablet     aspirin 81 MG EC tablet     atorvastatin (LIPITOR) 80 MG tablet     Calcium Lactate 100 MG TABS     ezetimibe (ZETIA) 10 MG tablet     lisinopril (ZESTRIL) 40 MG tablet      metoprolol succinate ER (TOPROL-XL) 50 MG 24 hr tablet     Multiple Vitamins-Minerals (MULTIVITAMIN ADULTS PO)     nitroglycerin (NITROSTAT) 0.4 MG SL tablet     No current facility-administered medications for this visit.        ALLERGIES:  Allergies   Allergen Reactions     Bupropion Hcl      rash     Cyclobenzaprine Hives     Sulfa Drugs Rash     Stated years ago had rash from sulfa drug       REVIEW OF SYSTEMS:  Constitutional:  No weight loss, fever, chills, weakness or fatigue.  HEENT:  Eyes:  No visual loss, blurred vision, double vision or yellow sclerae. No hearing loss, sneezing, congestion, runny nose or sore throat.  Skin:  No rash or itching.  Cardiovascular: per HPI  Respiratory: per HPI  GI:  No anorexia, nausea, vomiting or diarrhea. No abdominal pain or blood.  :  No dysurea, hematuria  Neurologic:  No headache, dizziness, syncope, paralysis, ataxia, numbness or tingling in the extremities. No change in bowel or bladder control.  Musculoskeletal:  No muscle, back pain, joint pain or stiffness.  Hematologic:  No anemia, bleeding or bruising.  Lymphatics:  No enlarged nodes. No history of splenectomy.  Psychiatric:  No history of depression or anxiety.  Endocrine:  No reports of sweating, cold or heat intolerance. No polyuria or polydipsia.  Allergies:  No history of asthma, hives, eczema or rhinitis.    PHYSICAL EXAM:  /70 (BP Location: Right arm, Patient Position: Sitting, Cuff Size: Adult Regular)   Pulse 73   Wt 85.3 kg (188 lb)   SpO2 94%   BMI 37.97 kg/m    Constitutional: awake, alert, no distress  Eyes: PERRL, sclera nonicteric  ENT: trachea midline  Respiratory: Lungs clear  Cardiovascular: Regular rate and rhythm, no murmurs  GI: nondistended, nontender, bowel sounds present  Lymph/Hematologic: no lymphadenopathy  Skin: dry, no rash  Musculoskeletal: good muscle tone, strength 5/5 in upper and lower extremities  Neurologic: no focal deficits  Neuropsychiatric: appropriate  affact    DATA:  Lab:   Recent Labs   Lab Test 03/16/20  0750 09/27/19  0916   CHOL 147 147   HDL 62 66   LDL 70 66   TRIG 74 74       ASSESSMENT:   69-year-old female seen for follow-up of coronary disease.  She is doing well and has no concerning symptoms.  Blood pressure is well controlled.  No additional testing is needed.    She may be switching healthcare systems, she lives in [place], [place].  She is thinking about being closer to her home town.    RECOMMENDATIONS:  1.  Coronary artery disease with previous stent  -Continue current medications    2.  Hypertension  -Controlled on current therapy    3.  Dyslipidemia  -Lipids at goal, continue atorvastatin    Follow-up in 1 year with BRAND[facility].  However patient may change to a different system to be closer to home.    Geovanni Chanel MD  Cardiology - [facility]  Pager:  [phone]  Text Page  September 17, 2020            Thank you for allowing me to participate in the care of your patient.    Sincerely,     Geovanni Chanel MD     [facility]

## 2020-11-19 DIAGNOSIS — E78.5 HYPERLIPIDEMIA LDL GOAL <70: ICD-10-CM

## 2020-11-19 RX ORDER — EZETIMIBE 10 MG/1
10 TABLET ORAL DAILY
Qty: 90 TABLET | Refills: 3 | Status: SHIPPED | OUTPATIENT
Start: 2020-11-19 | End: 2021-12-02

## 2021-02-10 DIAGNOSIS — I25.10 CORONARY ARTERY DISEASE INVOLVING NATIVE CORONARY ARTERY, ANGINA PRESENCE UNSPECIFIED, UNSPECIFIED WHETHER NATIVE OR TRANSPLANTED HEART: ICD-10-CM

## 2021-02-10 DIAGNOSIS — I10 BENIGN ESSENTIAL HYPERTENSION: ICD-10-CM

## 2021-02-10 RX ORDER — METOPROLOL SUCCINATE 50 MG/1
50 TABLET, EXTENDED RELEASE ORAL EVERY MORNING
Qty: 90 TABLET | Refills: 3 | Status: SHIPPED | OUTPATIENT
Start: 2021-02-10

## 2021-12-02 DIAGNOSIS — E78.5 HYPERLIPIDEMIA LDL GOAL <70: ICD-10-CM

## 2021-12-02 RX ORDER — EZETIMIBE 10 MG/1
10 TABLET ORAL DAILY
Qty: 90 TABLET | Refills: 0 | Status: SHIPPED | OUTPATIENT
Start: 2021-12-02

## 2021-12-09 ENCOUNTER — LAB REQUISITION (OUTPATIENT)
Dept: LAB | Facility: CLINIC | Age: 71
End: 2021-12-09

## 2021-12-09 DIAGNOSIS — Z11.59 ENCOUNTER FOR SCREENING FOR OTHER VIRAL DISEASES: ICD-10-CM

## 2021-12-09 PROCEDURE — 86803 HEPATITIS C AB TEST: CPT | Performed by: NURSE PRACTITIONER

## 2021-12-10 LAB — HCV AB SERPL QL IA: NEGATIVE

## 2024-08-01 ENCOUNTER — LAB REQUISITION (OUTPATIENT)
Dept: LAB | Facility: CLINIC | Age: 74
End: 2024-08-01

## 2024-08-01 PROCEDURE — 87338 HPYLORI STOOL AG IA: CPT | Performed by: NURSE PRACTITIONER

## 2024-08-02 LAB — H PYLORI AG STL QL IA: NEGATIVE

## (undated) DEVICE — TOTE ANGIO CORP PC15AT SAN32CC83O

## (undated) DEVICE — KIT LG BORE TOUHY ACCESS PLUS MAP152

## (undated) DEVICE — CATH ANGIO JUDKINS JL3.5 6FRX100CM INFINITI 534618T

## (undated) DEVICE — 6FR X 100CM INFINITI TL DIAGNOSTIC CATHETER, AMPLATZ, AR MOD, FEMORAL SELECTIVE THRULUMEN, 0.038IN MAX GUIDEWIRE (EA/1)

## (undated) DEVICE — WIRE GUIDE 0.035"X260CM SAFE-T-J EXCHANGE G00517

## (undated) DEVICE — DEFIB PRO-PADZ LVP LQD GEL ADULT 8900-2105-01

## (undated) DEVICE — CATH ANGIO JUDKINS R4 6FRX100CM INFINITI 534621T

## (undated) DEVICE — SLEEVE TR BAND RADIAL COMPRESSION DEVICE 24CM TRB24-REG

## (undated) DEVICE — MANIFOLD KIT ANGIO AUTOMATED 014613

## (undated) DEVICE — INTRO GLIDESHEATH SLENDER 6FR 10X45CM 60-1060

## (undated) DEVICE — CATH ANGIO JUDKINS JL4 6FRX100CM INFINITI 534620T

## (undated) DEVICE — CATH ANGIO INFINITI PIGTAIL 145 6 SH 6FRX110CM  534-652S

## (undated) DEVICE — GUIDEWIRE VERRATA PLUS PRESSURE 185CM JTIP 10185JP

## (undated) DEVICE — KIT HAND CONTROL ANGIOTOUCH ACIST 65CM AT-P65

## (undated) RX ORDER — ACETAMINOPHEN 325 MG/1
TABLET ORAL
Status: DISPENSED
Start: 2020-03-16

## (undated) RX ORDER — HEPARIN SODIUM 200 [USP'U]/100ML
INJECTION, SOLUTION INTRAVENOUS
Status: DISPENSED
Start: 2020-03-16

## (undated) RX ORDER — VERAPAMIL HYDROCHLORIDE 2.5 MG/ML
INJECTION, SOLUTION INTRAVENOUS
Status: DISPENSED
Start: 2020-03-16

## (undated) RX ORDER — HEPARIN SODIUM 1000 [USP'U]/ML
INJECTION, SOLUTION INTRAVENOUS; SUBCUTANEOUS
Status: DISPENSED
Start: 2020-03-16

## (undated) RX ORDER — FENTANYL CITRATE 50 UG/ML
INJECTION, SOLUTION INTRAMUSCULAR; INTRAVENOUS
Status: DISPENSED
Start: 2020-03-16

## (undated) RX ORDER — NITROGLYCERIN 5 MG/ML
VIAL (ML) INTRAVENOUS
Status: DISPENSED
Start: 2020-03-16

## (undated) RX ORDER — REGADENOSON 0.08 MG/ML
INJECTION, SOLUTION INTRAVENOUS
Status: DISPENSED
Start: 2020-03-02

## (undated) RX ORDER — LIDOCAINE HYDROCHLORIDE 10 MG/ML
INJECTION, SOLUTION EPIDURAL; INFILTRATION; INTRACAUDAL; PERINEURAL
Status: DISPENSED
Start: 2020-03-16